# Patient Record
Sex: MALE | Race: WHITE | NOT HISPANIC OR LATINO | Employment: FULL TIME | URBAN - METROPOLITAN AREA
[De-identification: names, ages, dates, MRNs, and addresses within clinical notes are randomized per-mention and may not be internally consistent; named-entity substitution may affect disease eponyms.]

---

## 2024-11-02 ENCOUNTER — HOSPITAL ENCOUNTER (EMERGENCY)
Facility: HOSPITAL | Age: 59
Discharge: PSYCHIATRIC OR SUBSTANCE USE FACILITY | End: 2024-11-03
Attending: EMERGENCY MEDICINE
Payer: COMMERCIAL

## 2024-11-02 ENCOUNTER — APPOINTMENT (OUTPATIENT)
Dept: CARDIOLOGY | Facility: HOSPITAL | Age: 59
End: 2024-11-02
Payer: COMMERCIAL

## 2024-11-02 DIAGNOSIS — R45.851 SUICIDAL IDEATION: Primary | ICD-10-CM

## 2024-11-02 LAB
ALBUMIN SERPL BCP-MCNC: 4.6 G/DL (ref 3.4–5)
ALP SERPL-CCNC: 31 U/L (ref 33–120)
ALT SERPL W P-5'-P-CCNC: 25 U/L (ref 10–52)
AMPHETAMINES UR QL SCN: NORMAL
ANION GAP SERPL CALC-SCNC: 17 MMOL/L (ref 10–20)
APAP SERPL-MCNC: <10 UG/ML
AST SERPL W P-5'-P-CCNC: 17 U/L (ref 9–39)
BARBITURATES UR QL SCN: NORMAL
BASOPHILS # BLD AUTO: 0.05 X10*3/UL (ref 0–0.1)
BASOPHILS NFR BLD AUTO: 1.1 %
BENZODIAZ UR QL SCN: NORMAL
BILIRUB SERPL-MCNC: 1.1 MG/DL (ref 0–1.2)
BUN SERPL-MCNC: 15 MG/DL (ref 6–23)
BZE UR QL SCN: NORMAL
CALCIUM SERPL-MCNC: 9.3 MG/DL (ref 8.6–10.3)
CANNABINOIDS UR QL SCN: NORMAL
CHLORIDE SERPL-SCNC: 106 MMOL/L (ref 98–107)
CO2 SERPL-SCNC: 18 MMOL/L (ref 21–32)
CREAT SERPL-MCNC: 0.99 MG/DL (ref 0.5–1.3)
EGFRCR SERPLBLD CKD-EPI 2021: 89 ML/MIN/1.73M*2
EOSINOPHIL # BLD AUTO: 0.04 X10*3/UL (ref 0–0.7)
EOSINOPHIL NFR BLD AUTO: 0.8 %
ERYTHROCYTE [DISTWIDTH] IN BLOOD BY AUTOMATED COUNT: 14.5 % (ref 11.5–14.5)
ETHANOL SERPL-MCNC: <10 MG/DL
FENTANYL+NORFENTANYL UR QL SCN: NORMAL
GLUCOSE SERPL-MCNC: 172 MG/DL (ref 74–99)
HCT VFR BLD AUTO: 50 % (ref 41–52)
HGB BLD-MCNC: 16.5 G/DL (ref 13.5–17.5)
IMM GRANULOCYTES # BLD AUTO: 0.01 X10*3/UL (ref 0–0.7)
IMM GRANULOCYTES NFR BLD AUTO: 0.2 % (ref 0–0.9)
LYMPHOCYTES # BLD AUTO: 1.08 X10*3/UL (ref 1.2–4.8)
LYMPHOCYTES NFR BLD AUTO: 22.8 %
MCH RBC QN AUTO: 27.4 PG (ref 26–34)
MCHC RBC AUTO-ENTMCNC: 33 G/DL (ref 32–36)
MCV RBC AUTO: 83 FL (ref 80–100)
METHADONE UR QL SCN: NORMAL
MONOCYTES # BLD AUTO: 0.46 X10*3/UL (ref 0.1–1)
MONOCYTES NFR BLD AUTO: 9.7 %
NEUTROPHILS # BLD AUTO: 3.1 X10*3/UL (ref 1.2–7.7)
NEUTROPHILS NFR BLD AUTO: 65.4 %
NRBC BLD-RTO: 0 /100 WBCS (ref 0–0)
OPIATES UR QL SCN: NORMAL
OXYCODONE+OXYMORPHONE UR QL SCN: NORMAL
PCP UR QL SCN: NORMAL
PLATELET # BLD AUTO: 183 X10*3/UL (ref 150–450)
POTASSIUM SERPL-SCNC: 4.2 MMOL/L (ref 3.5–5.3)
PROT SERPL-MCNC: 7.5 G/DL (ref 6.4–8.2)
RBC # BLD AUTO: 6.02 X10*6/UL (ref 4.5–5.9)
SALICYLATES SERPL-MCNC: <3 MG/DL
SARS-COV-2 RNA RESP QL NAA+PROBE: NOT DETECTED
SODIUM SERPL-SCNC: 137 MMOL/L (ref 136–145)
WBC # BLD AUTO: 4.7 X10*3/UL (ref 4.4–11.3)

## 2024-11-02 PROCEDURE — 83036 HEMOGLOBIN GLYCOSYLATED A1C: CPT | Performed by: PSYCHIATRY & NEUROLOGY

## 2024-11-02 PROCEDURE — 80307 DRUG TEST PRSMV CHEM ANLYZR: CPT | Performed by: EMERGENCY MEDICINE

## 2024-11-02 PROCEDURE — 93005 ELECTROCARDIOGRAM TRACING: CPT

## 2024-11-02 PROCEDURE — 2500000001 HC RX 250 WO HCPCS SELF ADMINISTERED DRUGS (ALT 637 FOR MEDICARE OP): Performed by: EMERGENCY MEDICINE

## 2024-11-02 PROCEDURE — 80053 COMPREHEN METABOLIC PANEL: CPT | Performed by: EMERGENCY MEDICINE

## 2024-11-02 PROCEDURE — 2500000002 HC RX 250 W HCPCS SELF ADMINISTERED DRUGS (ALT 637 FOR MEDICARE OP, ALT 636 FOR OP/ED): Performed by: EMERGENCY MEDICINE

## 2024-11-02 PROCEDURE — 80143 DRUG ASSAY ACETAMINOPHEN: CPT | Performed by: EMERGENCY MEDICINE

## 2024-11-02 PROCEDURE — 99285 EMERGENCY DEPT VISIT HI MDM: CPT

## 2024-11-02 PROCEDURE — 85025 COMPLETE CBC W/AUTO DIFF WBC: CPT | Performed by: EMERGENCY MEDICINE

## 2024-11-02 PROCEDURE — 87635 SARS-COV-2 COVID-19 AMP PRB: CPT | Performed by: EMERGENCY MEDICINE

## 2024-11-02 PROCEDURE — 2500000002 HC RX 250 W HCPCS SELF ADMINISTERED DRUGS (ALT 637 FOR MEDICARE OP, ALT 636 FOR OP/ED): Performed by: STUDENT IN AN ORGANIZED HEALTH CARE EDUCATION/TRAINING PROGRAM

## 2024-11-02 PROCEDURE — 80061 LIPID PANEL: CPT | Performed by: PSYCHIATRY & NEUROLOGY

## 2024-11-02 PROCEDURE — 36415 COLL VENOUS BLD VENIPUNCTURE: CPT | Performed by: EMERGENCY MEDICINE

## 2024-11-02 RX ORDER — HYDROXYZINE HYDROCHLORIDE 25 MG/1
25 TABLET, FILM COATED ORAL ONCE
Status: COMPLETED | OUTPATIENT
Start: 2024-11-02 | End: 2024-11-02

## 2024-11-02 RX ORDER — PRAVASTATIN SODIUM 10 MG/1
10 TABLET ORAL DAILY
Status: DISCONTINUED | OUTPATIENT
Start: 2024-11-02 | End: 2024-11-03 | Stop reason: HOSPADM

## 2024-11-02 RX ORDER — PRAVASTATIN SODIUM 10 MG/1
10 TABLET ORAL NIGHTLY
Status: DISCONTINUED | OUTPATIENT
Start: 2024-11-02 | End: 2024-11-02

## 2024-11-02 RX ORDER — PRAVASTATIN SODIUM 10 MG/1
5 TABLET ORAL DAILY
COMMUNITY

## 2024-11-02 RX ORDER — PAROXETINE HYDROCHLORIDE 20 MG/1
10 TABLET, FILM COATED ORAL DAILY
Status: DISCONTINUED | OUTPATIENT
Start: 2024-11-02 | End: 2024-11-03 | Stop reason: HOSPADM

## 2024-11-02 RX ORDER — LISINOPRIL 10 MG/1
10 TABLET ORAL DAILY
Status: DISCONTINUED | OUTPATIENT
Start: 2024-11-02 | End: 2024-11-02

## 2024-11-02 RX ORDER — METFORMIN HYDROCHLORIDE 500 MG/1
1000 TABLET ORAL
Status: DISCONTINUED | OUTPATIENT
Start: 2024-11-02 | End: 2024-11-03 | Stop reason: HOSPADM

## 2024-11-02 RX ORDER — LISINOPRIL 10 MG/1
10 TABLET ORAL NIGHTLY
Status: DISCONTINUED | OUTPATIENT
Start: 2024-11-02 | End: 2024-11-03 | Stop reason: HOSPADM

## 2024-11-02 RX ORDER — HYDROXYZINE HYDROCHLORIDE 25 MG/1
25 TABLET, FILM COATED ORAL EVERY 4 HOURS PRN
Status: DISCONTINUED | OUTPATIENT
Start: 2024-11-02 | End: 2024-11-03 | Stop reason: HOSPADM

## 2024-11-02 RX ORDER — DAPAGLIFLOZIN 10 MG/1
10 TABLET, FILM COATED ORAL
COMMUNITY

## 2024-11-02 RX ORDER — DAPAGLIFLOZIN 5 MG/1
10 TABLET, FILM COATED ORAL DAILY
Status: DISCONTINUED | OUTPATIENT
Start: 2024-11-02 | End: 2024-11-03 | Stop reason: HOSPADM

## 2024-11-02 RX ORDER — LISINOPRIL 10 MG/1
10 TABLET ORAL 2 TIMES DAILY
Status: DISCONTINUED | OUTPATIENT
Start: 2024-11-02 | End: 2024-11-02

## 2024-11-02 RX ORDER — PAROXETINE 10 MG/1
10 TABLET, FILM COATED ORAL EVERY MORNING
Status: ON HOLD | COMMUNITY
End: 2024-11-04

## 2024-11-02 RX ORDER — LISINOPRIL 10 MG/1
10 TABLET ORAL DAILY
COMMUNITY

## 2024-11-02 RX ORDER — METFORMIN HYDROCHLORIDE 1000 MG/1
1000 TABLET ORAL
COMMUNITY

## 2024-11-02 RX ORDER — METFORMIN HYDROCHLORIDE 500 MG/1
1000 TABLET ORAL
Status: DISCONTINUED | OUTPATIENT
Start: 2024-11-02 | End: 2024-11-02 | Stop reason: SDUPTHER

## 2024-11-02 SDOH — HEALTH STABILITY: MENTAL HEALTH

## 2024-11-02 SDOH — HEALTH STABILITY: MENTAL HEALTH: BEHAVIORS/MOOD: CALM;COOPERATIVE

## 2024-11-02 SDOH — HEALTH STABILITY: MENTAL HEALTH: NEEDS EXPRESSED: EMOTIONAL

## 2024-11-02 SDOH — HEALTH STABILITY: MENTAL HEALTH: RISK OF SUICIDE: HIGH RISK

## 2024-11-02 SDOH — HEALTH STABILITY: MENTAL HEALTH: HAVE YOU HAD THESE THOUGHTS AND HAD SOME INTENTION OF ACTING ON THEM?: YES

## 2024-11-02 SDOH — SOCIAL STABILITY: SOCIAL NETWORK: VISITOR BEHAVIORS: APPROPRIATE FOR SITUATION

## 2024-11-02 SDOH — HEALTH STABILITY: MENTAL HEALTH: BEHAVIORAL HEALTH(WDL): EXCEPTIONS TO WDL

## 2024-11-02 SDOH — HEALTH STABILITY: MENTAL HEALTH: WAS THIS WITHIN THE PAST THREE MONTHS?: YES

## 2024-11-02 SDOH — HEALTH STABILITY: MENTAL HEALTH: ACTIVE SUICIDAL IDEATION WITH SPECIFIC PLAN AND INTENT (PAST 1 MONTH): NO

## 2024-11-02 SDOH — ECONOMIC STABILITY: HOUSING INSECURITY: FEELS SAFE LIVING IN HOME: NO

## 2024-11-02 SDOH — HEALTH STABILITY: MENTAL HEALTH: FOR HIGH RISK PATIENTS: ALL INTERVENTIONS ABOVE, PLUS:;1:1 PATIENT OBSERVER AT ALL TIMES

## 2024-11-02 SDOH — HEALTH STABILITY: MENTAL HEALTH: ACTIVE SUICIDAL IDEATION WITH SOME INTENT TO ACT, WITHOUT SPECIFIC PLAN (PAST 1 MONTH): NO

## 2024-11-02 SDOH — SOCIAL STABILITY: SOCIAL NETWORK: PARENT/GUARDIAN/SIGNIFICANT OTHER INVOLVEMENT: ATTENTIVE TO PATIENT NEEDS

## 2024-11-02 SDOH — HEALTH STABILITY: MENTAL HEALTH: BEHAVIORS/MOOD: SLEEPING

## 2024-11-02 SDOH — HEALTH STABILITY: MENTAL HEALTH: CONTENT: UNREMARKABLE

## 2024-11-02 SDOH — HEALTH STABILITY: MENTAL HEALTH: HAVE YOU ACTUALLY HAD ANY THOUGHTS OF KILLING YOURSELF?: YES

## 2024-11-02 SDOH — HEALTH STABILITY: MENTAL HEALTH: BEHAVIORS/MOOD: ANXIOUS

## 2024-11-02 SDOH — HEALTH STABILITY: MENTAL HEALTH: BEHAVIORS/MOOD: CALM

## 2024-11-02 SDOH — HEALTH STABILITY: MENTAL HEALTH: NON-SPECIFIC ACTIVE SUICIDAL THOUGHTS (PAST 1 MONTH): NO

## 2024-11-02 SDOH — SOCIAL STABILITY: SOCIAL NETWORK: EMOTIONAL SUPPORT GIVEN: REASSURE

## 2024-11-02 SDOH — HEALTH STABILITY: MENTAL HEALTH: HAVE YOU BEEN THINKING ABOUT HOW YOU MIGHT DO THIS?: YES

## 2024-11-02 SDOH — HEALTH STABILITY: MENTAL HEALTH: SUICIDAL BEHAVIOR (LIFETIME): NO

## 2024-11-02 SDOH — HEALTH STABILITY: MENTAL HEALTH
HAVE YOU STARTED TO WORK OUT OR WORKED OUT THE DETAILS OF HOW TO KILL YOURSELF? DO YOU INTENT TO CARRY OUT THIS PLAN?: YES

## 2024-11-02 SDOH — HEALTH STABILITY: MENTAL HEALTH: SLEEP PATTERN: DIFFICULTY FALLING ASLEEP

## 2024-11-02 SDOH — HEALTH STABILITY: MENTAL HEALTH
DEPRESSION SYMPTOMS: APPETITE CHANGE;CHANGE IN ENERGY LEVEL;FEELINGS OF HELPLESSNESS;FEELINGS OF HOPELESSESS;IMPAIRED CONCENTRATION;SLEEP DISTURBANCE

## 2024-11-02 SDOH — HEALTH STABILITY: MENTAL HEALTH: NEEDS EXPRESSED: DENIES

## 2024-11-02 SDOH — HEALTH STABILITY: MENTAL HEALTH: IN THE PAST FEW WEEKS, HAVE YOU WISHED YOU WERE DEAD?: NO

## 2024-11-02 SDOH — HEALTH STABILITY: MENTAL HEALTH: HAVE YOU WISHED YOU WERE DEAD OR WISHED YOU COULD GO TO SLEEP AND NOT WAKE UP?: YES

## 2024-11-02 SDOH — HEALTH STABILITY: MENTAL HEALTH: HAVE YOU EVER DONE ANYTHING, STARTED TO DO ANYTHING, OR PREPARED TO DO ANYTHING TO END YOUR LIFE?: NO

## 2024-11-02 SDOH — HEALTH STABILITY: MENTAL HEALTH: SUICIDE ASSESSMENT: ADULT (C-SSRS)

## 2024-11-02 SDOH — HEALTH STABILITY: MENTAL HEALTH: WISH TO BE DEAD (PAST 1 MONTH): YES

## 2024-11-02 SDOH — HEALTH STABILITY: MENTAL HEALTH: DESCRIBE YOUR THOUGHTS OF KILLING YOURSELF RIGHT NOW:: NO

## 2024-11-02 SDOH — HEALTH STABILITY: MENTAL HEALTH: ARE YOU HAVING THOUGHTS OF KILLING YOURSELF RIGHT NOW?: YES

## 2024-11-02 SDOH — SOCIAL STABILITY: SOCIAL INSECURITY: FAMILY BEHAVIORS: APPROPRIATE FOR SITUATION

## 2024-11-02 SDOH — HEALTH STABILITY: MENTAL HEALTH: ANXIETY SYMPTOMS: GENERALIZED;PANIC ATTACK;FEELINGS OF DOOM

## 2024-11-02 SDOH — SOCIAL STABILITY: SOCIAL INSECURITY: FAMILY BEHAVIORS: APPROPRIATE FOR SITUATION;CALM;COOPERATIVE

## 2024-11-02 SDOH — HEALTH STABILITY: MENTAL HEALTH: IN THE PAST WEEK, HAVE YOU BEEN HAVING THOUGHTS ABOUT KILLING YOURSELF?: YES

## 2024-11-02 SDOH — SOCIAL STABILITY: SOCIAL INSECURITY: FAMILY BEHAVIORS: APPROPRIATE FOR SITUATION;CALM;COOPERATIVE;SUPPORTIVE

## 2024-11-02 SDOH — HEALTH STABILITY: MENTAL HEALTH: IN THE PAST FEW WEEKS, HAVE YOU FELT THAT YOU OR YOUR FAMILY WOULD BE BETTER OFF IF YOU WERE DEAD?: YES

## 2024-11-02 SDOH — SOCIAL STABILITY: SOCIAL NETWORK: VISITOR BEHAVIORS: APPROPRIATE FOR SITUATION;CALM;COOPERATIVE

## 2024-11-02 SDOH — HEALTH STABILITY: MENTAL HEALTH: HAVE YOU EVER DONE ANYTHING, STARTED TO DO ANYTHING, OR PREPARED TO DO ANYTHING TO END YOUR LIFE?: YES

## 2024-11-02 SDOH — HEALTH STABILITY: MENTAL HEALTH: HAVE YOU EVER TRIED TO KILL YOURSELF?: NO

## 2024-11-02 SDOH — HEALTH STABILITY: MENTAL HEALTH
OTHER SUICIDE PRECAUTIONS INCLUDE: PATIENT PLACED IN AN EASILY OBSERVABLE ROOM WITH DOOR/CURTAIN REMAINING OPEN;PATIENT PLACED IN GOWN (SNAPS OR PAPER GOWNS PREFERRED) AND WANDED;REMAINING RISKS IDENTIFIED AND MITIGATED;PROVIDER NOTIFIED;PATIENT PLACED IN PSYCH SAFE ROOM (IF AVAILABLE);EL

## 2024-11-02 SDOH — HEALTH STABILITY: MENTAL HEALTH: BEHAVIORS/MOOD: APPROPRIATE FOR SITUATION;CALM;COOPERATIVE;SAD;TEARFUL

## 2024-11-02 SDOH — HEALTH STABILITY: MENTAL HEALTH: SLEEP PATTERN: INSOMNIA;DIFFICULTY FALLING ASLEEP

## 2024-11-02 SDOH — ECONOMIC STABILITY: GENERAL

## 2024-11-02 ASSESSMENT — LIFESTYLE VARIABLES
EVER FELT BAD OR GUILTY ABOUT YOUR DRINKING: NO
EVER HAD A DRINK FIRST THING IN THE MORNING TO STEADY YOUR NERVES TO GET RID OF A HANGOVER: NO
PRESCIPTION_ABUSE_PAST_12_MONTHS: NO
SUBSTANCE_ABUSE_PAST_12_MONTHS: NO
HAVE YOU EVER FELT YOU SHOULD CUT DOWN ON YOUR DRINKING: NO
HAVE PEOPLE ANNOYED YOU BY CRITICIZING YOUR DRINKING: NO
TOTAL SCORE: 0

## 2024-11-02 ASSESSMENT — COLUMBIA-SUICIDE SEVERITY RATING SCALE - C-SSRS
6. HAVE YOU EVER DONE ANYTHING, STARTED TO DO ANYTHING, OR PREPARED TO DO ANYTHING TO END YOUR LIFE?: NO
1. IN THE PAST MONTH, HAVE YOU WISHED YOU WERE DEAD OR WISHED YOU COULD GO TO SLEEP AND NOT WAKE UP?: YES
5. HAVE YOU STARTED TO WORK OUT OR WORKED OUT THE DETAILS OF HOW TO KILL YOURSELF? DO YOU INTEND TO CARRY OUT THIS PLAN?: YES
2. HAVE YOU ACTUALLY HAD ANY THOUGHTS OF KILLING YOURSELF?: YES
4. HAVE YOU HAD THESE THOUGHTS AND HAD SOME INTENTION OF ACTING ON THEM?: YES

## 2024-11-02 ASSESSMENT — PAIN SCALES - GENERAL: PAINLEVEL_OUTOF10: 0 - NO PAIN

## 2024-11-02 ASSESSMENT — PAIN - FUNCTIONAL ASSESSMENT: PAIN_FUNCTIONAL_ASSESSMENT: 0-10

## 2024-11-02 NOTE — ED PROVIDER NOTES
HPI   Chief Complaint   Patient presents with    Suicidal       Patient with a past medical history of depression, anxiety, hypertension presents emergency department for suicidal ideation.  Patient started a new job recently.  He is been depressed that he about self-harm for the last week.  States the thoughts got worse tonight and this morning where he was thinking about shooting himself.  Patient told his wife about his thoughts and he called 911 was brought here for further evaluation.  Patient does have a history of anxiety depression, no prior psychiatric hospitalizations.  He has had suicidal ideation in the past.  Patient's not currently seeing any therapists.              Patient History   Past Medical History:   Diagnosis Date    Anxiety     Diabetes mellitus (Multi)      History reviewed. No pertinent surgical history.  No family history on file.  Social History     Tobacco Use    Smoking status: Never    Smokeless tobacco: Not on file   Vaping Use    Vaping status: Never Used   Substance Use Topics    Alcohol use: Never    Drug use: Never       Physical Exam   ED Triage Vitals [11/02/24 0957]   Temperature Heart Rate Respirations BP   36.6 °C (97.8 °F) 98 18 (!) 156/100      Pulse Ox Temp Source Heart Rate Source Patient Position   97 % Temporal -- --      BP Location FiO2 (%)     -- --       Physical Exam  Vitals and nursing note reviewed.   Constitutional:       General: He is not in acute distress.     Appearance: He is well-developed.   HENT:      Head: Normocephalic and atraumatic.      Right Ear: External ear normal.      Left Ear: External ear normal.      Mouth/Throat:      Mouth: Mucous membranes are moist.      Pharynx: Oropharynx is clear.   Eyes:      Extraocular Movements: Extraocular movements intact.      Conjunctiva/sclera: Conjunctivae normal.   Neck:      Trachea: Trachea normal.   Cardiovascular:      Rate and Rhythm: Normal rate.   Pulmonary:      Effort: Pulmonary effort is normal.  No respiratory distress.      Breath sounds: Normal breath sounds.   Abdominal:      General: Bowel sounds are normal.      Palpations: Abdomen is soft.      Tenderness: There is no abdominal tenderness.   Musculoskeletal:         General: No swelling or tenderness.      Cervical back: No tenderness.   Skin:     General: Skin is warm and dry.      Capillary Refill: Capillary refill takes less than 2 seconds.   Neurological:      General: No focal deficit present.      Mental Status: He is alert and oriented to person, place, and time.   Psychiatric:         Mood and Affect: Mood is depressed.         Thought Content: Thought content includes suicidal ideation. Thought content does not include homicidal ideation.           ED Course & [facility]   ED Course as of 12/03/24 1038   Sat Nov 02, 2024   1015 Patient presents with suicidal ideation. Available chart reviewed. On initial assessment the patient was found non-toxic, no acute distress, vitals hemodynamically stable and afebrile.  Will start medical clearance. []   1221 Labs are unremarkable other than hyperglycemia of 172.  Urine drug screen is negative.  Acute tox panel is negative.  Patient is medically cleared for psychiatric evaluation and treatment. []   1634 Patient care signed out to oncoming physician pending EPAT evaluation.  Hydroxyzine was ordered as needed []      ED Course User Index  [JH] Ronald Rob MD         Diagnoses as of 12/03/24 1038   Suicidal ideation                 No data recorded     Roe Coma Scale Score: 15 (11/02/24 1007 : Brianda Chamorro RN)                           Medical Decision Making      Procedure  Procedures     Ronald Rob MD  11/02/24 1634       Ronald Rob MD  12/03/24 1038

## 2024-11-02 NOTE — ED NOTES
"Pt brought to ED for SI. Pt says he has no suicidal history and that this is new to him, he does have hx of anxiety and takes paxil. Pt says he has been having thoughts of harming  himself for the past few months, exacerbated by a new job that he started one month ago. Pt says he took a new job, \"can't handle it\", and can't find a new job. Pt says his thoughts got worse yesterday and started to scare him. Pt says he had thoughts of wanting to crash his car or shoot himself. Pt says his wife removed firearms from the house. Pt is calm and cooperative. Pt is tearful when talking to RN and is seeking help     Brianda Chamorro RN  11/02/24 1011    "

## 2024-11-03 ENCOUNTER — HOSPITAL ENCOUNTER (INPATIENT)
Facility: HOSPITAL | Age: 59
LOS: 5 days | Discharge: HOME | End: 2024-11-08
Attending: PSYCHIATRY & NEUROLOGY | Admitting: PSYCHIATRY & NEUROLOGY
Payer: COMMERCIAL

## 2024-11-03 VITALS
HEIGHT: 71 IN | DIASTOLIC BLOOD PRESSURE: 99 MMHG | SYSTOLIC BLOOD PRESSURE: 133 MMHG | RESPIRATION RATE: 16 BRPM | OXYGEN SATURATION: 97 % | WEIGHT: 185 LBS | BODY MASS INDEX: 25.9 KG/M2 | TEMPERATURE: 97.3 F | HEART RATE: 100 BPM

## 2024-11-03 DIAGNOSIS — F33.2 SEVERE RECURRENT MAJOR DEPRESSION WITHOUT PSYCHOTIC FEATURES (MULTI): ICD-10-CM

## 2024-11-03 DIAGNOSIS — E11.9 TYPE 2 DIABETES MELLITUS WITHOUT COMPLICATION, WITHOUT LONG-TERM CURRENT USE OF INSULIN (MULTI): ICD-10-CM

## 2024-11-03 LAB
CHOLEST SERPL-MCNC: 217 MG/DL (ref 0–199)
CHOLESTEROL/HDL RATIO: 4.4
HDLC SERPL-MCNC: 49.6 MG/DL
LDLC SERPL CALC-MCNC: 128 MG/DL
NON HDL CHOLESTEROL: 167 MG/DL (ref 0–149)
TRIGL SERPL-MCNC: 197 MG/DL (ref 0–149)
VLDL: 39 MG/DL (ref 0–40)

## 2024-11-03 PROCEDURE — 2500000001 HC RX 250 WO HCPCS SELF ADMINISTERED DRUGS (ALT 637 FOR MEDICARE OP): Performed by: EMERGENCY MEDICINE

## 2024-11-03 PROCEDURE — 2500000002 HC RX 250 W HCPCS SELF ADMINISTERED DRUGS (ALT 637 FOR MEDICARE OP, ALT 636 FOR OP/ED): Performed by: STUDENT IN AN ORGANIZED HEALTH CARE EDUCATION/TRAINING PROGRAM

## 2024-11-03 PROCEDURE — 1140000001 HC PRIVATE PSYCH ROOM DAILY

## 2024-11-03 PROCEDURE — 2500000002 HC RX 250 W HCPCS SELF ADMINISTERED DRUGS (ALT 637 FOR MEDICARE OP, ALT 636 FOR OP/ED): Performed by: EMERGENCY MEDICINE

## 2024-11-03 RX ORDER — PRAVASTATIN SODIUM 10 MG/1
5 TABLET ORAL DAILY
Status: DISCONTINUED | OUTPATIENT
Start: 2024-11-04 | End: 2024-11-08 | Stop reason: HOSPADM

## 2024-11-03 RX ORDER — METFORMIN HYDROCHLORIDE 1000 MG/1
1000 TABLET ORAL
Status: DISCONTINUED | OUTPATIENT
Start: 2024-11-04 | End: 2024-11-08 | Stop reason: HOSPADM

## 2024-11-03 RX ORDER — LISINOPRIL 10 MG/1
10 TABLET ORAL DAILY
Status: DISCONTINUED | OUTPATIENT
Start: 2024-11-03 | End: 2024-11-08 | Stop reason: HOSPADM

## 2024-11-03 RX ORDER — ADHESIVE BANDAGE
30 BANDAGE TOPICAL DAILY PRN
Status: DISCONTINUED | OUTPATIENT
Start: 2024-11-03 | End: 2024-11-08 | Stop reason: HOSPADM

## 2024-11-03 RX ORDER — ACETAMINOPHEN 325 MG/1
650 TABLET ORAL EVERY 4 HOURS PRN
Status: DISCONTINUED | OUTPATIENT
Start: 2024-11-03 | End: 2024-11-08 | Stop reason: HOSPADM

## 2024-11-03 RX ORDER — ALUMINUM HYDROXIDE, MAGNESIUM HYDROXIDE, AND SIMETHICONE 2400; 240; 2400 MG/30ML; MG/30ML; MG/30ML
30 SUSPENSION ORAL EVERY 6 HOURS PRN
Status: DISCONTINUED | OUTPATIENT
Start: 2024-11-03 | End: 2024-11-08 | Stop reason: HOSPADM

## 2024-11-03 RX ORDER — DAPAGLIFLOZIN 10 MG/1
10 TABLET, FILM COATED ORAL
Status: DISCONTINUED | OUTPATIENT
Start: 2024-11-04 | End: 2024-11-08 | Stop reason: HOSPADM

## 2024-11-03 RX ORDER — OLANZAPINE 10 MG/2ML
5 INJECTION, POWDER, FOR SOLUTION INTRAMUSCULAR EVERY 6 HOURS PRN
Status: DISCONTINUED | OUTPATIENT
Start: 2024-11-03 | End: 2024-11-08 | Stop reason: HOSPADM

## 2024-11-03 RX ORDER — OLANZAPINE 5 MG/1
5 TABLET ORAL EVERY 6 HOURS PRN
Status: DISCONTINUED | OUTPATIENT
Start: 2024-11-03 | End: 2024-11-08 | Stop reason: HOSPADM

## 2024-11-03 RX ORDER — TRAZODONE HYDROCHLORIDE 50 MG/1
25 TABLET ORAL NIGHTLY PRN
Status: DISCONTINUED | OUTPATIENT
Start: 2024-11-03 | End: 2024-11-08 | Stop reason: HOSPADM

## 2024-11-03 RX ORDER — HYDROXYZINE HYDROCHLORIDE 25 MG/1
25 TABLET, FILM COATED ORAL EVERY 6 HOURS PRN
Status: DISCONTINUED | OUTPATIENT
Start: 2024-11-03 | End: 2024-11-08 | Stop reason: HOSPADM

## 2024-11-03 SDOH — HEALTH STABILITY: MENTAL HEALTH: IN THE PAST FEW WEEKS, HAVE YOU WISHED YOU WERE DEAD?: YES

## 2024-11-03 SDOH — HEALTH STABILITY: PHYSICAL HEALTH
HOW OFTEN DO YOU NEED TO HAVE SOMEONE HELP YOU WHEN YOU READ INSTRUCTIONS, PAMPHLETS, OR OTHER WRITTEN MATERIAL FROM YOUR DOCTOR OR PHARMACY?: NEVER

## 2024-11-03 SDOH — SOCIAL STABILITY: SOCIAL INSECURITY: ARE YOU OR HAVE YOU BEEN THREATENED OR ABUSED PHYSICALLY, EMOTIONALLY, OR SEXUALLY BY ANYONE?: NO

## 2024-11-03 SDOH — ECONOMIC STABILITY: FOOD INSECURITY: WITHIN THE PAST 12 MONTHS, THE FOOD YOU BOUGHT JUST DIDN'T LAST AND YOU DIDN'T HAVE MONEY TO GET MORE.: NEVER TRUE

## 2024-11-03 SDOH — HEALTH STABILITY: PHYSICAL HEALTH: ON AVERAGE, HOW MANY MINUTES DO YOU ENGAGE IN EXERCISE AT THIS LEVEL?: 0 MIN

## 2024-11-03 SDOH — SOCIAL STABILITY: SOCIAL INSECURITY: FAMILY BEHAVIORS: APPROPRIATE FOR SITUATION;SUPPORTIVE;COOPERATIVE

## 2024-11-03 SDOH — SOCIAL STABILITY: SOCIAL INSECURITY: ARE YOU MARRIED, WIDOWED, DIVORCED, SEPARATED, NEVER MARRIED, OR LIVING WITH A PARTNER?: MARRIED

## 2024-11-03 SDOH — SOCIAL STABILITY: SOCIAL NETWORK: IN A TYPICAL WEEK, HOW MANY TIMES DO YOU TALK ON THE PHONE WITH FAMILY, FRIENDS, OR NEIGHBORS?: THREE TIMES A WEEK

## 2024-11-03 SDOH — SOCIAL STABILITY: SOCIAL NETWORK: HOW OFTEN DO YOU ATTEND CHURCH OR RELIGIOUS SERVICES?: NEVER

## 2024-11-03 SDOH — SOCIAL STABILITY: SOCIAL INSECURITY
WITHIN THE LAST YEAR, HAVE YOU BEEN KICKED, HIT, SLAPPED, OR OTHERWISE PHYSICALLY HURT BY YOUR PARTNER OR EX-PARTNER?: NO

## 2024-11-03 SDOH — SOCIAL STABILITY: SOCIAL INSECURITY: POSSIBLE ABUSE REPORTED TO:: OTHER (COMMENT)

## 2024-11-03 SDOH — SOCIAL STABILITY: SOCIAL INSECURITY: ARE THERE ANY APPARENT SIGNS OF INJURIES/BEHAVIORS THAT COULD BE RELATED TO ABUSE/NEGLECT?: NO

## 2024-11-03 SDOH — HEALTH STABILITY: MENTAL HEALTH: BEHAVIORS/MOOD: SAD;TEARFUL;CALM

## 2024-11-03 SDOH — SOCIAL STABILITY: SOCIAL INSECURITY: HAVE YOU HAD THOUGHTS OF HARMING ANYONE ELSE?: NO

## 2024-11-03 SDOH — HEALTH STABILITY: MENTAL HEALTH
DO YOU FEEL STRESS - TENSE, RESTLESS, NERVOUS, OR ANXIOUS, OR UNABLE TO SLEEP AT NIGHT BECAUSE YOUR MIND IS TROUBLED ALL THE TIME - THESE DAYS?: VERY MUCH

## 2024-11-03 SDOH — SOCIAL STABILITY: SOCIAL INSECURITY: FAMILY BEHAVIORS: CALM;SUPPORTIVE

## 2024-11-03 SDOH — HEALTH STABILITY: MENTAL HEALTH: BEHAVIORAL HEALTH(WDL): EXCEPTIONS TO WDL

## 2024-11-03 SDOH — SOCIAL STABILITY: SOCIAL INSECURITY: DO YOU FEEL ANYONE HAS EXPLOITED OR TAKEN ADVANTAGE OF YOU FINANCIALLY OR OF YOUR PERSONAL PROPERTY?: NO

## 2024-11-03 SDOH — ECONOMIC STABILITY: FOOD INSECURITY: HOW HARD IS IT FOR YOU TO PAY FOR THE VERY BASICS LIKE FOOD, HOUSING, MEDICAL CARE, AND HEATING?: NOT VERY HARD

## 2024-11-03 SDOH — HEALTH STABILITY: MENTAL HEALTH: IN THE PAST FEW WEEKS, HAVE YOU FELT THAT YOU OR YOUR FAMILY WOULD BE BETTER OFF IF YOU WERE DEAD?: YES

## 2024-11-03 SDOH — HEALTH STABILITY: MENTAL HEALTH: BEHAVIORS/MOOD: SLEEPING

## 2024-11-03 SDOH — HEALTH STABILITY: MENTAL HEALTH: BEHAVIORS/MOOD: CALM

## 2024-11-03 SDOH — ECONOMIC STABILITY: HOUSING INSECURITY: IN THE LAST 12 MONTHS, WAS THERE A TIME WHEN YOU WERE NOT ABLE TO PAY THE MORTGAGE OR RENT ON TIME?: NO

## 2024-11-03 SDOH — HEALTH STABILITY: MENTAL HEALTH: HAVE YOU EVER TRIED TO KILL YOURSELF?: NO

## 2024-11-03 SDOH — ECONOMIC STABILITY: HOUSING INSECURITY: IN THE PAST 12 MONTHS, HOW MANY TIMES HAVE YOU MOVED WHERE YOU WERE LIVING?: 1

## 2024-11-03 SDOH — SOCIAL STABILITY: SOCIAL INSECURITY: HAVE YOU HAD ANY THOUGHTS OF HARMING ANYONE ELSE?: NO

## 2024-11-03 SDOH — HEALTH STABILITY: MENTAL HEALTH: SLEEP PATTERN: EARLY AWAKENING

## 2024-11-03 SDOH — ECONOMIC STABILITY: INCOME INSECURITY: IN THE PAST 12 MONTHS HAS THE ELECTRIC, GAS, OIL, OR WATER COMPANY THREATENED TO SHUT OFF SERVICES IN YOUR HOME?: NO

## 2024-11-03 SDOH — SOCIAL STABILITY: SOCIAL INSECURITY: ABUSE: ADULT

## 2024-11-03 SDOH — SOCIAL STABILITY: SOCIAL INSECURITY: FAMILY BEHAVIORS: APPROPRIATE FOR SITUATION;CALM;SUPPORTIVE

## 2024-11-03 SDOH — SOCIAL STABILITY: SOCIAL INSECURITY: DOES ANYONE TRY TO KEEP YOU FROM HAVING/CONTACTING OTHER FRIENDS OR DOING THINGS OUTSIDE YOUR HOME?: NO

## 2024-11-03 SDOH — HEALTH STABILITY: MENTAL HEALTH: ARE YOU HAVING THOUGHTS OF KILLING YOURSELF RIGHT NOW?: NO

## 2024-11-03 SDOH — SOCIAL STABILITY: SOCIAL NETWORK: PARENT/GUARDIAN/SIGNIFICANT OTHER INVOLVEMENT: ATTENTIVE TO PATIENT NEEDS

## 2024-11-03 SDOH — ECONOMIC STABILITY: HOUSING INSECURITY: AT ANY TIME IN THE PAST 12 MONTHS, WERE YOU HOMELESS OR LIVING IN A SHELTER (INCLUDING NOW)?: NO

## 2024-11-03 SDOH — SOCIAL STABILITY: SOCIAL NETWORK
DO YOU BELONG TO ANY CLUBS OR ORGANIZATIONS SUCH AS CHURCH GROUPS, UNIONS, FRATERNAL OR ATHLETIC GROUPS, OR SCHOOL GROUPS?: NO

## 2024-11-03 SDOH — HEALTH STABILITY: MENTAL HEALTH: IN THE PAST WEEK, HAVE YOU BEEN HAVING THOUGHTS ABOUT KILLING YOURSELF?: YES

## 2024-11-03 SDOH — HEALTH STABILITY: MENTAL HEALTH: SUICIDE ASSESSMENT: ADULT (C-SSRS)

## 2024-11-03 SDOH — SOCIAL STABILITY: SOCIAL INSECURITY: WITHIN THE LAST YEAR, HAVE YOU BEEN AFRAID OF YOUR PARTNER OR EX-PARTNER?: NO

## 2024-11-03 SDOH — SOCIAL STABILITY: SOCIAL INSECURITY: FAMILY BEHAVIORS: CALM

## 2024-11-03 SDOH — SOCIAL STABILITY: SOCIAL INSECURITY: HAS ANYONE EVER THREATENED TO HURT YOUR FAMILY OR YOUR PETS?: NO

## 2024-11-03 SDOH — HEALTH STABILITY: MENTAL HEALTH: FOR HIGH RISK PATIENTS: 1:1 PATIENT OBSERVER AT ALL TIMES

## 2024-11-03 SDOH — HEALTH STABILITY: MENTAL HEALTH: BEHAVIORS/MOOD: SAD;TEARFUL

## 2024-11-03 SDOH — ECONOMIC STABILITY: FOOD INSECURITY: WITHIN THE PAST 12 MONTHS, YOU WORRIED THAT YOUR FOOD WOULD RUN OUT BEFORE YOU GOT THE MONEY TO BUY MORE.: NEVER TRUE

## 2024-11-03 SDOH — SOCIAL STABILITY: SOCIAL INSECURITY: WITHIN THE LAST YEAR, HAVE YOU BEEN HUMILIATED OR EMOTIONALLY ABUSED IN OTHER WAYS BY YOUR PARTNER OR EX-PARTNER?: NO

## 2024-11-03 SDOH — SOCIAL STABILITY: SOCIAL NETWORK: HOW OFTEN DO YOU GET TOGETHER WITH FRIENDS OR RELATIVES?: TWICE A WEEK

## 2024-11-03 SDOH — HEALTH STABILITY: PHYSICAL HEALTH: ON AVERAGE, HOW MANY DAYS PER WEEK DO YOU ENGAGE IN MODERATE TO STRENUOUS EXERCISE (LIKE A BRISK WALK)?: 0 DAYS

## 2024-11-03 SDOH — HEALTH STABILITY: MENTAL HEALTH

## 2024-11-03 SDOH — SOCIAL STABILITY: SOCIAL INSECURITY
WITHIN THE LAST YEAR, HAVE YOU BEEN RAPED OR FORCED TO HAVE ANY KIND OF SEXUAL ACTIVITY BY YOUR PARTNER OR EX-PARTNER?: NO

## 2024-11-03 SDOH — SOCIAL STABILITY: SOCIAL INSECURITY: WERE YOU ABLE TO COMPLETE ALL THE BEHAVIORAL HEALTH SCREENINGS?: YES

## 2024-11-03 SDOH — HEALTH STABILITY: MENTAL HEALTH: BEHAVIORS/MOOD: CALM;COOPERATIVE

## 2024-11-03 SDOH — ECONOMIC STABILITY: TRANSPORTATION INSECURITY: IN THE PAST 12 MONTHS, HAS LACK OF TRANSPORTATION KEPT YOU FROM MEDICAL APPOINTMENTS OR FROM GETTING MEDICATIONS?: NO

## 2024-11-03 SDOH — SOCIAL STABILITY: SOCIAL INSECURITY: DO YOU FEEL UNSAFE GOING BACK TO THE PLACE WHERE YOU ARE LIVING?: NO

## 2024-11-03 SDOH — SOCIAL STABILITY: SOCIAL NETWORK: HOW OFTEN DO YOU ATTEND MEETINGS OF THE CLUBS OR ORGANIZATIONS YOU BELONG TO?: NEVER

## 2024-11-03 ASSESSMENT — ACTIVITIES OF DAILY LIVING (ADL)
ADEQUATE_TO_COMPLETE_ADL: YES
LACK_OF_TRANSPORTATION: NO
TOILETING: INDEPENDENT
JUDGMENT_ADEQUATE_SAFELY_COMPLETE_DAILY_ACTIVITIES: YES
HEARING - LEFT EAR: FUNCTIONAL
EFFECT OF PAIN ON DAILY ACTIVITIES: MINIMAL
DRESSING YOURSELF: INDEPENDENT
FEEDING YOURSELF: INDEPENDENT
BATHING: INDEPENDENT
HEARING - RIGHT EAR: FUNCTIONAL
GROOMING: INDEPENDENT
PATIENT'S MEMORY ADEQUATE TO SAFELY COMPLETE DAILY ACTIVITIES?: YES
WALKS IN HOME: INDEPENDENT

## 2024-11-03 ASSESSMENT — LIFESTYLE VARIABLES
AUDIT-C TOTAL SCORE: 0
ORIENTATION AND CLOUDING OF SENSORIUM: ORIENTED AND CAN DO SERIAL ADDITIONS
CIWA TOTAL SCORE: 0
SUBSTANCE_ABUSE_PAST_12_MONTHS: NO
PRESCIPTION_ABUSE_PAST_12_MONTHS: NO
NAUSEA AND VOMITING: NO NAUSEA AND NO VOMITING
SKIP TO QUESTIONS 9-10: 1
PAROXYSMAL SWEATS: NO SWEAT VISIBLE
AGITATION: NORMAL ACTIVITY
HOW OFTEN DO YOU HAVE A DRINK CONTAINING ALCOHOL: NEVER
AUDIT-C TOTAL SCORE: 0
HOW OFTEN DO YOU HAVE 6 OR MORE DRINKS ON ONE OCCASION: NEVER
TREMOR: NO TREMOR
HOW MANY STANDARD DRINKS CONTAINING ALCOHOL DO YOU HAVE ON A TYPICAL DAY: PATIENT DOES NOT DRINK
ANXIETY: NO ANXIETY, AT EASE
HEADACHE, FULLNESS IN HEAD: NOT PRESENT
TOTAL_SCORE: 2
AUDITORY DISTURBANCES: NOT PRESENT
VISUAL DISTURBANCES: NOT PRESENT

## 2024-11-03 ASSESSMENT — PAIN - FUNCTIONAL ASSESSMENT: PAIN_FUNCTIONAL_ASSESSMENT: 0-10

## 2024-11-03 ASSESSMENT — PAIN SCALES - GENERAL
PAINLEVEL_OUTOF10: 0 - NO PAIN

## 2024-11-03 ASSESSMENT — COLUMBIA-SUICIDE SEVERITY RATING SCALE - C-SSRS
2. HAVE YOU ACTUALLY HAD ANY THOUGHTS OF KILLING YOURSELF?: NO
2. HAVE YOU ACTUALLY HAD ANY THOUGHTS OF KILLING YOURSELF?: NO
6. HAVE YOU EVER DONE ANYTHING, STARTED TO DO ANYTHING, OR PREPARED TO DO ANYTHING TO END YOUR LIFE?: NO
1. SINCE LAST CONTACT, HAVE YOU WISHED YOU WERE DEAD OR WISHED YOU COULD GO TO SLEEP AND NOT WAKE UP?: NO
6. HAVE YOU EVER DONE ANYTHING, STARTED TO DO ANYTHING, OR PREPARED TO DO ANYTHING TO END YOUR LIFE?: NO
1. SINCE LAST CONTACT, HAVE YOU WISHED YOU WERE DEAD OR WISHED YOU COULD GO TO SLEEP AND NOT WAKE UP?: NO

## 2024-11-03 ASSESSMENT — PATIENT HEALTH QUESTIONNAIRE - PHQ9
SUM OF ALL RESPONSES TO PHQ9 QUESTIONS 1 & 2: 5
2. FEELING DOWN, DEPRESSED OR HOPELESS: NEARLY EVERY DAY
1. LITTLE INTEREST OR PLEASURE IN DOING THINGS: MORE THAN HALF THE DAYS

## 2024-11-03 ASSESSMENT — PAIN DESCRIPTION - DESCRIPTORS: DESCRIPTORS: PATIENT UNABLE TO DESCRIBE

## 2024-11-03 NOTE — PROGRESS NOTES
Emergency Medicine Transition of Care Note.    I received Juwan Arana in signout from Dr. Babcock.  Please see the previous ED provider note for all HPI, PE and MDM up to the time of signout. This is in addition to the primary record.    In brief Juwan Arana is an 59 y.o. male presenting for   Chief Complaint   Patient presents with    Suicidal        ED Course as of 11/07/24 0134   Sat Nov 02, 2024   1015 Patient presents with suicidal ideation. Available chart reviewed. On initial assessment the patient was found non-toxic, no acute distress, vitals hemodynamically stable and afebrile.  Will start medical clearance. []   1221 Labs are unremarkable other than hyperglycemia of 172.  Urine drug screen is negative.  Acute tox panel is negative.  Patient is medically cleared for psychiatric evaluation and treatment. []   1634 Patient care signed out to oncoming physician pending EPAT evaluation.  Hydroxyzine was ordered as needed [JH]      ED Course User Index  [JH] Ronald Rob MD         Diagnoses as of 11/07/24 0134   Suicidal ideation       Medical Decision Making  59-year-old male with suicidal ideation, medically cleared, pending EPAT placement for inpatient psychiatric evaluation.  Patient with one-to-one sitter, resting comfortably.  Signed out to me pending EPAT placement.        Final diagnoses:   [R48.227] Suicidal ideation           Procedure  Procedures    Alexia Mendoza MD

## 2024-11-03 NOTE — PROGRESS NOTES
Emergency Medicine Transition of Care Note.    I received Juwan Arana in signout from Dr. Rob.  Please see the previous ED provider note for all HPI, PE and MDM up to the time of signout at 1600. This is in addition to the primary record.    In brief Juwan Arana is an 59 y.o. male presenting for   Chief Complaint   Patient presents with    Suicidal     At the time of signout we were awaiting: epat evaluation    ED Course as of 11/03/24 0052   Sat Nov 02, 2024   1015 Patient presents with suicidal ideation. Available chart reviewed. On initial assessment the patient was found non-toxic, no acute distress, vitals hemodynamically stable and afebrile.  Will start medical clearance. []   1221 Labs are unremarkable other than hyperglycemia of 172.  Urine drug screen is negative.  Acute tox panel is negative.  Patient is medically cleared for psychiatric evaluation and treatment. []   1634 Patient care signed out to oncoming physician pending EPAT evaluation.  Hydroxyzine was ordered as needed []      ED Course User Index  [] Ronald Rob MD       Medical Decision Making  Patient was signed out to me pending EPAT evaluation.  EPAT evaluated the patient and are recommending placement.  I did order the patient's evening dose of metformin and lisinopril.  The patient was signed out to the oncoming team pending placement.    Final diagnoses:   None           Procedure  Procedures    Aj Babcock MD

## 2024-11-03 NOTE — NURSING NOTE
"Nurse Admission Note    Reason for admission in patient's own words:  \"I having thoughts to end my life.\"     Patient living arrangements prior to admission:  home    Legal status:  voluntarily    Medical consult notification to:  Subha    Admission folder given to:   Pt.    Problems/treatment plans:  Depression/SI    Patient requests family to be notified of admission?    yes  Person notified:  wife    Strengths:  motivated and ready for change    Liabilities:  low self esteem, self-care deficit    Previous mental health treatment:  Past psychiatric history yes    Preliminary discharge planning needs:  Community resources   "

## 2024-11-03 NOTE — PROGRESS NOTES
Patient care signed out to me by Dr. Mendoza.  Patient awaiting placement.  Patient's able to be placed voluntarily.  EMTALA completed and patient will be transferred in stable condition.    MDM/ED Course  ED Course as of 11/03/24 1531   Sat Nov 02, 2024   1015 Patient presents with suicidal ideation. Available chart reviewed. On initial assessment the patient was found non-toxic, no acute distress, vitals hemodynamically stable and afebrile.  Will start medical clearance. []   1221 Labs are unremarkable other than hyperglycemia of 172.  Urine drug screen is negative.  Acute tox panel is negative.  Patient is medically cleared for psychiatric evaluation and treatment. [JH]   1634 Patient care signed out to oncoming physician pending EPAT evaluation.  Hydroxyzine was ordered as needed []      ED Course User Index  [] Ronald Rob MD         Diagnoses as of 11/03/24 1531   Suicidal ideation

## 2024-11-03 NOTE — CARE PLAN
"The patient's goals for the shift include \"settle in\"    The clinical goals for the shift include orient to unit    Over the shift, the patient did make progress toward the following goals. Barriers to progression include being newly admitted. Recommendations to address these barriers include orientation to unit.      Problem: Diabetes  Goal: Increase stability of blood glucose readings by end of shift  Outcome: Progressing     "

## 2024-11-04 PROBLEM — I10 PRIMARY HYPERTENSION: Status: ACTIVE | Noted: 2024-11-04

## 2024-11-04 PROBLEM — E11.9 TYPE 2 DIABETES MELLITUS WITHOUT COMPLICATION, WITHOUT LONG-TERM CURRENT USE OF INSULIN (MULTI): Status: ACTIVE | Noted: 2024-11-04

## 2024-11-04 PROBLEM — E78.2 MIXED HYPERLIPIDEMIA: Status: ACTIVE | Noted: 2024-11-04

## 2024-11-04 LAB
ATRIAL RATE: 88 BPM
EST. AVERAGE GLUCOSE BLD GHB EST-MCNC: 237 MG/DL
GLUCOSE BLD MANUAL STRIP-MCNC: 157 MG/DL (ref 74–99)
GLUCOSE BLD MANUAL STRIP-MCNC: 188 MG/DL (ref 74–99)
HBA1C MFR BLD: 9.9 %
P AXIS: 2 DEGREES
PR INTERVAL: 170 MS
Q ONSET: 252 MS
QRS COUNT: 14 BEATS
QRS DURATION: 97 MS
QT INTERVAL: 361 MS
QTC CALCULATION(BAZETT): 437 MS
QTC FREDERICIA: 410 MS
R AXIS: -29 DEGREES
T AXIS: -4 DEGREES
T OFFSET: 433 MS
VENTRICULAR RATE: 88 BPM

## 2024-11-04 PROCEDURE — 2500000001 HC RX 250 WO HCPCS SELF ADMINISTERED DRUGS (ALT 637 FOR MEDICARE OP): Performed by: INTERNAL MEDICINE

## 2024-11-04 PROCEDURE — 2500000002 HC RX 250 W HCPCS SELF ADMINISTERED DRUGS (ALT 637 FOR MEDICARE OP, ALT 636 FOR OP/ED): Performed by: INTERNAL MEDICINE

## 2024-11-04 PROCEDURE — 99222 1ST HOSP IP/OBS MODERATE 55: CPT | Performed by: PSYCHIATRY & NEUROLOGY

## 2024-11-04 PROCEDURE — 99221 1ST HOSP IP/OBS SF/LOW 40: CPT | Performed by: INTERNAL MEDICINE

## 2024-11-04 PROCEDURE — 82947 ASSAY GLUCOSE BLOOD QUANT: CPT

## 2024-11-04 PROCEDURE — 2500000001 HC RX 250 WO HCPCS SELF ADMINISTERED DRUGS (ALT 637 FOR MEDICARE OP): Performed by: PSYCHIATRY & NEUROLOGY

## 2024-11-04 PROCEDURE — 1140000001 HC PRIVATE PSYCH ROOM DAILY

## 2024-11-04 RX ORDER — ESCITALOPRAM OXALATE 10 MG/1
10 TABLET ORAL DAILY
Status: DISCONTINUED | OUTPATIENT
Start: 2024-11-05 | End: 2024-11-08 | Stop reason: HOSPADM

## 2024-11-04 RX ORDER — ESCITALOPRAM OXALATE 5 MG/1
5 TABLET ORAL ONCE
Status: COMPLETED | OUTPATIENT
Start: 2024-11-04 | End: 2024-11-04

## 2024-11-04 SDOH — ECONOMIC STABILITY: GENERAL

## 2024-11-04 SDOH — HEALTH STABILITY: MENTAL HEALTH: DEPRESSION SYMPTOMS: CHANGE IN ENERGY LEVEL;SLEEP DISTURBANCE;LOSS OF INTEREST

## 2024-11-04 SDOH — HEALTH STABILITY: MENTAL HEALTH: BEHAVIOR: ORIENTED

## 2024-11-04 SDOH — SOCIAL STABILITY: SOCIAL INSECURITY: RELIGIOUS/CULTURAL FACTORS: NONE REPORTED

## 2024-11-04 SDOH — HEALTH STABILITY: MENTAL HEALTH: MENTAL HEALTH TREATMENT: MEDICATION

## 2024-11-04 SDOH — ECONOMIC STABILITY: GENERAL: INCOME/EXPENSE INFORMATION: INCOME MEETS EXPENSES

## 2024-11-04 SDOH — HEALTH STABILITY: MENTAL HEALTH: ANXIETY SYMPTOMS: GENERALIZED

## 2024-11-04 SDOH — SOCIAL STABILITY: SOCIAL INSECURITY: FEELS SAFE LIVING IN HOME: YES

## 2024-11-04 SDOH — ECONOMIC STABILITY: HOUSING INSECURITY: FEELS SAFE LIVING IN HOME: YES

## 2024-11-04 SDOH — HEALTH STABILITY: MENTAL HEALTH: PREVIOUS MENTAL HEALTH TREATMENT: OTHER (COMMENT)

## 2024-11-04 ASSESSMENT — ACTIVITIES OF DAILY LIVING (ADL): BATHING: NO ASSISTANCE

## 2024-11-04 ASSESSMENT — COLUMBIA-SUICIDE SEVERITY RATING SCALE - C-SSRS
6. HAVE YOU EVER DONE ANYTHING, STARTED TO DO ANYTHING, OR PREPARED TO DO ANYTHING TO END YOUR LIFE?: NO
1. SINCE LAST CONTACT, HAVE YOU WISHED YOU WERE DEAD OR WISHED YOU COULD GO TO SLEEP AND NOT WAKE UP?: NO
1. SINCE LAST CONTACT, HAVE YOU WISHED YOU WERE DEAD OR WISHED YOU COULD GO TO SLEEP AND NOT WAKE UP?: NO
6. HAVE YOU EVER DONE ANYTHING, STARTED TO DO ANYTHING, OR PREPARED TO DO ANYTHING TO END YOUR LIFE?: NO
2. HAVE YOU ACTUALLY HAD ANY THOUGHTS OF KILLING YOURSELF?: NO
2. HAVE YOU ACTUALLY HAD ANY THOUGHTS OF KILLING YOURSELF?: NO

## 2024-11-04 ASSESSMENT — PAIN - FUNCTIONAL ASSESSMENT: PAIN_FUNCTIONAL_ASSESSMENT: 0-10

## 2024-11-04 ASSESSMENT — LIFESTYLE VARIABLES
SUBSTANCE_ABUSE_PAST_12_MONTHS: NO
PRESCIPTION_ABUSE_PAST_12_MONTHS: NO

## 2024-11-04 ASSESSMENT — PAIN SCALES - GENERAL: PAINLEVEL_OUTOF10: 0 - NO PAIN

## 2024-11-04 NOTE — H&P
"      The reason for admission includes: depression worse, difficulty sleeping, feeling depressed, feeling suicidal, stressed at work, and \"I wanted to die\".  Onset of symptoms was gradual starting 1 month ago with gradually worsening course since that time. Psychosocial Stressors: occupational.        Chart reviewed including notes from Care Everywhere and documentation incorporated by reference and direct quotation.    History Of Present Illness  Juwan Arana is a 59 y.o. male.    ED notes:  Pt brought to ED for SI. Pt says he has no suicidal history and that this is new to him, he does have hx of anxiety and takes paxil. Pt says he has been having thoughts of harming himself for the past few months, exacerbated by a new job that he started one month ago. Pt says he took a new job, \"can't handle it\", and can't find a new job. Pt says his thoughts got worse yesterday and started to scare him. Pt says he had thoughts of wanting to crash his car or shoot himself. Pt says his wife removed firearms from the house. Pt is calm and cooperative. Pt is tearful when talking to RN and is seeking help   Patient with a past medical history of depression, anxiety, hypertension presents emergency department for suicidal ideation. Patient started a new job recently. He is been depressed that he about self-harm for the last week. States the thoughts got worse tonight and this morning where he was thinking about shooting himself. Patient told his wife about his thoughts and he called 911 was brought here for further evaluation. Patient does have a history of anxiety depression, no prior psychiatric hospitalizations. He has had suicidal ideation in the past. Patient's not currently seeing any therapists.     EPAT notes:  58 y/o  male seen via telehealth at [facility] for a psychiatric evaluation. He was brought in by EMS after a call was placed due to pt sharing he was struggling with suicidal ideation with a plan. " History of anxiety and depression, currently being monitored by PCP for med management. He denies any drug of alcohol usage, UDS confirmed his reports. Admits to suicidal ideation for the last month with is progressively getting worse over the last few days. Pt started a new job a month ago and it has caused an increase of anxiety and depression. Pt did have a firearm in the home, wife removed today. No history of psychiatric placement. Denies hallucinations, delusions or paranoia.    58 y/o male presents flat, engaged but somber. He shares that he does not feel safe with himself due to the increase of suicidal ideation over the last day. He reports at 3am in the morning he was having thoughts of getting his firearm and shooting himself. Pt explains that after starting his new job he feels overwhelmed. The new employer requires him to learn a few online systems to make orders and he is having a hard time catching on. At times he does not feel supported in learning everything but employer does appear to be patient with him. Pt feels he should know it by now and feels triggered before and after work. He is sleeping roughly 1-2 hours night and has loss 15lbs in the last 3 -4 weeks. “I can't eat or sleep .I don't feel safe, I don't want to hurt myself. I need help” He is chronically on edge and struggling with tremors and managing his racing thoughts. States he is compliant with medication but it does not help with reducing anxiety at this time.     Unit encounter:  Patient initially sitting in group with sitter at arms length.  Emerged willingly to meet with provider and  in the group room.  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  During this time, he reports additional stressors were placed on him at work and found this unmanageable.  He notes this led to  worsening depression including reduced appetite with significant weight loss, trouble with sleep, and resulting suicidality with thoughts of driving his car into a fixed object or using the firearm at home to shoot himself.  He describes that he reached out to his provider was encouraged to come to the hospital and did so.  He notes his goal is to feel better to be less depressed to be able to get back on track.  He reports since coming into the hospital he is already starting to feel better and more safe and is not thinking of harming himself at this time.  He is eager for improvement and willing to participate to this extent including adjustment of medications.  He denies thoughts of harming others and he does not present with features of psychosis and rebeca denying hallucinosis and delusions.  Patient reports he feels able to remain safe on the unit and able to communicate otherwise if he is no longer able to feel safe with understanding that the sitter would be discontinued as such.      Past Medical History  He has a past medical history of Anxiety and Diabetes mellitus (Multi).    Past Psychiatric History:   Denies previous linkage with psychiatry but notes that he saw a counselor in the past.  He reports he has tried 2 different psychiatric medications from primary care provider including Zoloft which he reports gave him hallucinations and Paxil which has been dosed 10 mg and he is finding it to be unhelpful.  He denies previous psychiatric admissions and he denies previous suicide attempts.    He endorses a family history of schizophrenia.  He denies family members having  by suicide.    Surgical History  He has no past surgical history on file.     Social History  He reports that he has never smoked. He does not have any smokeless tobacco history on file. He reports that he does not drink alcohol and does not use drugs.  Patient reports he graduated high school and has been working in the food service  "industry since age 16.  He currently lives at home with his wife and has 2 grown children in their 30s.  He reports there is a 22 caliber firearm in the home but he believes it has been removed by his wife.  He denies any  affiliation, he denies any legal troubles past or present.     Allergies  Patient has no known allergies.    Review of Systems    Psychiatric ROS - Adult  Anxiety: General Anxiety Disorder (ALBERTA)ALBERTA Behaviors: difficult to control worry, excessive anxiety/worry, difficulty concentrating, restlessness, and sleep disturbance  Depression: anhedonia, appetite decreased, concentration, energy, guilt, helpless, hopeless, persistent thoughts of death, sleep decreased , suicidal thoughts, and withdrawn  Delirium: negative  Psychosis: negative  Tamela: negative  Safety Issues: passive death wish, suicidal ideation, and weapon (gun) in home  Psychiatric ROS Comment: as noted    Physical Exam        Mental Status Examination  General Appearance: Age appearing male wearing hospital provided garments good eye contact  Gait/Station: Ambulating steadily without need for assistance, no abnormal movements observed including tremor or extrapyramidal features  Speech: Normal rate, volume, prosody  Mood: \" While I have been depressed\"  Affect: Constricted and Congruent with mood and topic of conversation  Thought Process: Linear, goal directed  Thought Associations: No loosening of associations  Thought Content: suicidal  Perception: No perceptual abnormalities noted  Level of Consciousness: Alert  Orientation: Alert and oriented to person, place, time and situation  Attention and Concentration: Intact  Recent Memory: Intact as evidenced by ability to recall details from the past 24 hours   Remote Memory: Intact as evidenced by ability to recall previous medical issues   Executive function: Intact  Language: Naming intact  Fund of knowledge: Good  Insight: Fair, as evidenced by recognition of mental health " "crisis and decompensation and benefit of treatment  Judgment: Fair, as evidenced by help-seeking behavior, ability to reason through medical decision making, compliance with treatment recommendations, and desire to cause harm to self      Psychiatric Risk Assessment  Violence Risk Assessment: major mental illness, male, and presence of firearms in home  Acute Risk of Harm to Others is Considered: low   Suicide Risk Assessment: access to weapons, , current psychiatric illness, feelings of hopelessness, male, presence of firearms in home, suicidal ideations, and suicidal plans  Protective Factors against Suicide: adherence to  treatment, employment, hopefulness/future orientation, marriage/partnership, moral objections to suicide, positive family relationships, and sense of responsibility toward family  Acute Risk of Harm to Self is Considered: moderate    Last Recorded Vitals  Blood pressure 114/78, pulse 85, temperature 36.9 °C (98.4 °F), temperature source Temporal, resp. rate 16, height 1.803 m (5' 11\"), weight 83.8 kg (184 lb 12.8 oz), SpO2 100%.    Relevant Results        Results for orders placed or performed during the hospital encounter of 11/03/24 (from the past 96 hours)   Lipid Panel   Result Value Ref Range    Cholesterol 217 (H) 0 - 199 mg/dL    HDL-Cholesterol 49.6 mg/dL    Cholesterol/HDL Ratio 4.4     LDL Calculated 128 (H) <=99 mg/dL    VLDL 39 0 - 40 mg/dL    Triglycerides 197 (H) 0 - 149 mg/dL    Non HDL Cholesterol 167 (H) 0 - 149 mg/dL   Hemoglobin A1C   Result Value Ref Range    Hemoglobin A1C 9.9 (H) See comment %    Estimated Average Glucose 237 Not Established mg/dL   POCT GLUCOSE   Result Value Ref Range    POCT Glucose 157 (H) 74 - 99 mg/dL          Assessment/Plan   Assessment & Plan  Severe recurrent major 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  Patient endorsing symptoms including trouble with sleep, reduced interest, increased guilt, low energy, decreased appetite with weight loss of 18 lbs, suicidal thoughts with plan with methods and access to these methods.  Patient reaching out for assistance and brought into the emergency department for referral and admission to psychiatric hospital.  Patient willing to have medications changed and adjusted well in the hospital and remain admitted voluntarily pending improvement.  Patient will benefit from the admission for safety stabilization and monitoring.    Biological -     Reviewed with patient previous meds including Zoloft which gave him hallucinations as a side effect per his report and we will not use this.    Reviewed with patient the use of Paxil which is currently likely subtherapeutic we dosed and in older adults is not necessarily indicated due to the side effect profile and so we will not use this.    Discussed with patient initiating Lexapro and titrating to a treatment dose while being monitored and patient consents to this.  As such we will initiate Lexapro 5 mg once today for depression and anxiety and then titrating to 10 mg daily starting tomorrow and thereafter for treatment of depression anxiety.    Patient also made aware that trazodone will be available as a as needed to help with sleep related to mood disorder.  Initial dose of 25 mg can be titrated.    Medical to please co-manage those pertinences     Discussion with patient regarding risk benefits and alternatives and side effects with opportunity to ask questions and questions answered.  Patient given consent to the plan as noted    2. Psychological -       Patient is encouraged to participate with the therapeutic milieu and program and group therapy      3. Sociological -      Patient encouraged to cooperate with social work staff on issues relevant to discharge planning         Goals for  discharge include:  Psychiatric condition: to lower acute risk, return to baseline level of functioning, work to identify positive coping skills to manage psychiatric symptoms, allow for reconciliation of medications  Physical condition: increase daily physical activity, demonstrate interest in completing daily activity, and complete Activities of Daily Living  Social function: identify protective factors and family supports, increase social interactions on the unit with peers and providing staff, and demonstrate appropriate problem solving skills for engaging after care on discharge    I personally spent 50 minutes today providing care for this patient, including preparation, face to face time, documentation and other services such as review of medical records, diagnostic result, patient education, counseling, coordination of care as specified in the encounter.    Parts of this chart have been completed using voice recognition software.  Please excuse any errors of transcription.  Despite the medical decision making time stamp, my medical decision making has taken place during the patient's entire visit.  Thought process and reason for plan has been formulated from the time that I saw the patient until the time of disposition and is not specific to one specific moment during their visit and furthermore the medical decision making encompasses the entire chart and not only that represented in this note.    Nutrition/Malnutrition:  The diagnosis of malnutrition has significant impact on risk adjustment, mortality and readmission rates, length of stay and hospital reimbursement.  The below is a representation of nutrition status if evaluated.  If blank, there is no associated malnutrition finding to incorporate per the dietician team:               Medication Consent  Medication Consent: risks, benefits, side effects reviewed for all ordered meds and patient expressed understanding and consent obtained    Rambo MOSCOSO  Lan, DO

## 2024-11-04 NOTE — GROUP NOTE
Group Topic: Leisure Skills   Group Date: 11/4/2024  Start Time: 1330  End Time: 1430  Facilitators: QUINTON Orona   Department: [facility] VIRTUAL    Number of Participants: 12   Group Focus: leisure skills, other following directions, self-esteem, and social skills  Treatment Modality: Leisure Development and Other: Recreation Therapy  Interventions utilized were leisure development and mental fitness  Purpose: In this recreational therapy session patients engaged in a group game of “[place] Mentality” developing social interaction, increased focus and following directions. This activity aims to provide a positive and enjoyable experience for participants.           Name: Juwan Arana YOB: 1965   MR: [id number]      Facilitator: Recreational Therapist  Level of Participation: active  Quality of Participation: appropriate/pleasant, attentive, cooperative, and engaged  Interactions with others: appropriate  Mood/Affect: appropriate and brightens with interaction  Cognition: coherent/clear  Progress: Moderate  Comments: pt problem is depressed mood. Pt engages easily and appropriately. Able to follow game directions. with staff and peers. Observed smiling at times. Displays good attention to tasks.    Plan: continue with services

## 2024-11-04 NOTE — NURSING NOTE
HPILIP NOTE     Problem:  SI     Behavior:  Patient was calm, quiet and cooperative. He spent time in the group room watching tv and interacting with peers. He denies SI/HI    Appetite/Meals: Snacks provided       Interventions:  Every 15 minute checks    Response:  Safety maintained     Plan:  Continue every 15 minute checks

## 2024-11-04 NOTE — CARE PLAN
Problem: Risk for Suicide  Goal: Accepts medications as prescribed/needed this shift  Outcome: Progressing     Problem: Risk for Suicide  Goal: No self harm this shift  Outcome: Progressing   The patient's goals for the shift include talk with a doctor    The clinical goals for the shift include encourage participation in milieu    Over the shift, the patient did make progress toward the following goals. Barriers to progression include Depression. Recommendations to address these barriers include encourage pt to take scheduled medications, discuss in team.

## 2024-11-04 NOTE — NURSING NOTE
PHILIP NOTE     Problem:  SI     Behavior:  Pt states his mood is good.  He denies suicidal ideation.  He is going to groups and taking scheduled medications.  Group Participation: yes  Appetite/Meals: good       Interventions:  1:1 interaction with encouragement, give scheduled medications, every 15 minute checks, encourage group participation    Response:  Pt denies suicidal ideation.  He is going to groups and taking scheduled medication.      Plan:  1:1 interaction with encouragement, give scheduled medications, every 15 minute checks, encourage group participation

## 2024-11-04 NOTE — GROUP NOTE
Group Topic: Personal Responsibility   Group Date: 11/4/2024  Start Time: 1100  End Time: 1200  Facilitators: QUINTON Orona   Department: [facility] REHABTH VIRTUAL    Number of Participants: 11   Group Focus: coping skills, personal responsibility, problem solving, and self-awareness  Treatment Modality: Psychoeducation and Other: Recreation Therapy   Interventions utilized were exploration, patient education, problem solving, and support  Purpose: Patients engaged in a group session aimed to increase knowledge focused on stressors/triggers, warning signs/symptoms and personal responsibilities. Pts also encouraged to create a coping skills toolbox for those times when in a distressed situation/s. This group also aims to help foster use of prosocial behaviors and become aware of and handle negative behaviors in a more effective manner.     Name: Juwan Arana YOB: 1965   MR: [id number]      Facilitator: Recreational Therapist  Level of Participation: moderate  Quality of Participation: appropriate/pleasant and cooperative  Interactions with others:  quiet and appropriate  Mood/Affect: flat  Cognition: coherent/clear  Progress: Moderate  Comments: pt problem is depressed mood. Pt attends group with sitter at side. Quiet with little engagement during discussion. Pt leaves to speak with other staff but returns. Observed completing copping skills list.   Plan: continue with services

## 2024-11-04 NOTE — CONSULTS
[facility]: [facility]  PROGRESS NOTE      Juwan Arana is a 59 y.o. male that is being seen  today for medical management at [facility]..  Subjective   Patient is a 59-year-old male with a history of diabetes, hypertension, depression and anxiety and hyperlipidemia who is being seen for medical management [facility].  Patient started a new job and has been having increased depression and anxiety.  Patient was evaluated in the ER and medically cleared for admission to [facility].  Patient was having some suicidal ideations.  Denies any suicidal ideations at present.  Patient's blood pressure has been fairly controlled.  Patient is on statins for hyperlipidemia.  Patient also is on Farxiga for diabetes.  Patient's recent hemoglobin A1c is 9.9.  Patient is also on metformin.  Most likely patient was not taking his medications.  Blood sugar on the unit is 159.      ROS  Negative for fever or chills  Negative for sore throat, ear pain, nasal discharge  Negative for cough, shortness of breath or wheezing  Negative for chest pain, palpitations, swelling of legs  Negative for abdominal pain, constipation, diarrhea, blood in the stools  Negative for urinary complaints  Negative for headache, dizziness, weakness or numbness  Negative for joint pain  Positive for depression and anxiety  All other systems reviewed and were negative   Vitals:    11/04/24 0520   BP: 114/78   Pulse: 85   Resp: 16   Temp: 36.9 °C (98.4 °F)   SpO2: 100%      Vitals:    11/03/24 1735   Weight: 83.8 kg (184 lb 12.8 oz)     Body mass index is 25.77 kg/m².  Physical Exam  Constitutional: Patient does not appear to be in any acute distress  Head and Face: NCAT  Eyes: Normal external exam, EOMI  ENT: Normal external inspection of ears and nose. Oropharynx normal.  Cardiovascular: RRR, S1/S2, no murmurs, rubs, or gallops, radial pulses +2, no edema of extremities  Pulmonary: CTAB, no respiratory distress.  Abdomen: +BS, soft,  non-tender, nondistended, no guarding or rebound, no masses noted  MSK: No joint swelling, normal movements of all extremities. Range of motion- normal.  Skin- No lesions, contusions, or erythema.  Peripheral puslses palpable bilaterally 2+  Neuro: AAO X3, Cranial nerves 2-12 grossly intact,DTR 2+ in all 4 limbs       LABS   [unfilled]  Lab Results   Component Value Date    GLUCOSE 172 (H) 11/02/2024    CALCIUM 9.3 11/02/2024     11/02/2024    K 4.2 11/02/2024    CO2 18 (L) 11/02/2024     11/02/2024    BUN 15 11/02/2024    CREATININE 0.99 11/02/2024     Lab Results   Component Value Date    ALT 25 11/02/2024    AST 17 11/02/2024    ALKPHOS 31 (L) 11/02/2024    BILITOT 1.1 11/02/2024     Lab Results   Component Value Date    WBC 4.7 11/02/2024    HGB 16.5 11/02/2024    HCT 50.0 11/02/2024    MCV 83 11/02/2024     11/02/2024     Lab Results   Component Value Date    CHOL 217 (H) 11/02/2024     Lab Results   Component Value Date    HDL 49.6 11/02/2024     Lab Results   Component Value Date    LDLCALC 128 (H) 11/02/2024     Lab Results   Component Value Date    TRIG 197 (H) 11/02/2024     Lab Results   Component Value Date    HGBA1C 9.9 (H) 11/02/2024     Other labs not included in the list above were reviewed either before or during this encounter.    History    Past Medical History:   Diagnosis Date    Anxiety     Diabetes mellitus (Multi)      No past surgical history on file.  No family history on file.  No Known Allergies  Current Facility-Administered Medications on File Prior to Encounter   Medication Dose Route Frequency Provider Last Rate Last Admin    [DISCONTINUED] dapagliflozin propanediol (Farxiga) tablet 10 mg  10 mg oral Daily Ronald Rob MD   10 mg at 11/03/24 0823    [DISCONTINUED] hydrOXYzine HCL (Atarax) tablet 25 mg  25 mg oral q4h PRN Ronald Rob MD   25 mg at 11/03/24 0823    [DISCONTINUED] lisinopril tablet 10 mg  10 mg oral Nightly Ronald Rob MD        [DISCONTINUED]  metFORMIN (Glucophage) tablet 1,000 mg  1,000 mg oral BID Aj Babcock MD   1,000 mg at 11/03/24 0824    [DISCONTINUED] PARoxetine (Paxil) tablet 10 mg  10 mg oral Daily Ronald Rob MD   10 mg at 11/03/24 0824    [DISCONTINUED] pravastatin (Pravachol) tablet 10 mg  10 mg oral Daily Ronald Rob MD   10 mg at 11/03/24 0824     Current Outpatient Medications on File Prior to Encounter   Medication Sig Dispense Refill    dapagliflozin-saxagliptin (Qtern) 5-5 mg tablet Take 1 tablet by mouth once daily.      Farxiga 10 mg Take 1 tablet (10 mg) by mouth once daily with breakfast.      lisinopril 10 mg tablet Take 1 tablet (10 mg) by mouth once daily.      metFORMIN (Glucophage) 1,000 mg tablet Take 1 tablet (1,000 mg) by mouth 2 times daily (morning and late afternoon).      pravastatin (Pravachol) 10 mg tablet Take 0.5 tablets (5 mg) by mouth once daily.      [DISCONTINUED] PARoxetine (Paxil) 10 mg tablet Take 1 tablet (10 mg) by mouth once daily in the morning.         There is no immunization history on file for this patient.  Patient's medical history was reviewed and updated either before or during this encounter.  ASSESSMENT / PLAN:  Active Hospital Problems    Type 2 diabetes mellitus without complication, without long-term current use of insulin (Multi)      Mixed hyperlipidemia      Primary hypertension      *Severe recurrent major depression without psychotic features (Multi)       Patient is being seen for medical management at [facility].  Patient denies any suicidal ideations at present.  Patient is being seen by [facility] team.  Patient's hemoglobin A1c is 9.9 most likely patient was noncompliant with medications at home.  Will be on metformin and Farxiga and will continue to monitor blood sugars.  Blood pressure is fairly controlled.      Mik Mascorro MD

## 2024-11-04 NOTE — GROUP NOTE
Group Topic: Music Therapy   Group Date: 11/4/2024  Start Time: 1000  End Time: 1100  Facilitators: Inge Cohn   [facility] EXPRESS[facility]ER [facility]IRT[facility]    Number of Participants: 12   Group Focus: education, expressive outlet, leisure skills, music therapy, opportunity for choice/control, and reality orientation  Treatment Modality: Leisure Development and Music Therapy  Interventions Utilized were: active music engagement, education/instruction, exploration, leisure development, passive music engagement, reality testing, and sharing/discussion      Name: Juwan Arana YOB: 1965   MR: [id number]      Level of Participation: moderate  Quality of Participation: appropriate/pleasant, cooperative, engaged, and initiates communication  Interactions with others: appropriate  Mood/Affect: anxious and appropriate  Cognition, Pre Treatment: attentive, coherent/clear, goal directed, and logical  Cognition, Post Treatment: attentive, coherent/clear, goal directed, and logical  Progress: Minimal  Plan: continue with services

## 2024-11-04 NOTE — PROGRESS NOTES
Psychosocial assessment completed with pt. Pt is a 59y  male admitted for derepression and SI. Pt has been having increasing depression over the last month with thoughts to crash his car or shoot himself. Pt reports that the gun at home has been removed. Pt had reached out to his doctor and EMS was called to his home. Pt reports that work stressors have triggered his depression and lead to suicidal thoughts. Pt recently started a new job and there are a lot of work pressures. Pt does not like his current employee and plans to not return. Pt plans to find alternative employment. Pt lives with his spouse. Pt has two grown children and grandchildren which he has reasons to live for. Pt currently denies any SI. Pt denies that he would have acted upon the thoughts but he had difficulty controlling the thoughts. Pt also reported decreased sleep and appetite. Pt denies AVH or HI. Pt does not use tobacco, alcohol or drugs. Pt reports that the depression started about 6 yrs ago. Pt has been prescribed Paxil by his PCP but has not seen a psychiatrist. Pt has no past SA or inpatient hospitalizations. Pt has a sister with schizophrenia but no only family history of mental health. Pt does not have contact with this sister. Pt agreeable to treatment and coordination of outpatient services. Pt is pleasant and cooperative. Pt is A&Ox4. Voicemail left for pt's wife for collateral information. LSW to follow for structure, support, and discharge planning.     DHARA GreenW

## 2024-11-04 NOTE — PROGRESS NOTES
LSW met with pts wife during visitation. pt's wife mentioned that pt's mother also had depression, and pt's brother has bipolar. This information was not mentioned by pt as he only mentioned his sister having schizophrenia. Pt's wife mentioned that the pressure of the job has gotten to him. Both pt and wife do not feel it is good for him to return to his current job. LSW encouraged wife to tell them he would not be there for the rest of the week and then it could be figured out a way to tell them he would not be returning. 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 LSW will follow pt and assist as needed regarding employment concerns.    DHARA GreenW

## 2024-11-05 LAB
GLUCOSE BLD MANUAL STRIP-MCNC: 139 MG/DL (ref 74–99)
GLUCOSE BLD MANUAL STRIP-MCNC: 164 MG/DL (ref 74–99)

## 2024-11-05 PROCEDURE — 2500000002 HC RX 250 W HCPCS SELF ADMINISTERED DRUGS (ALT 637 FOR MEDICARE OP, ALT 636 FOR OP/ED): Performed by: INTERNAL MEDICINE

## 2024-11-05 PROCEDURE — 1140000001 HC PRIVATE PSYCH ROOM DAILY

## 2024-11-05 PROCEDURE — 82947 ASSAY GLUCOSE BLOOD QUANT: CPT

## 2024-11-05 PROCEDURE — 2500000001 HC RX 250 WO HCPCS SELF ADMINISTERED DRUGS (ALT 637 FOR MEDICARE OP): Performed by: PSYCHIATRY & NEUROLOGY

## 2024-11-05 PROCEDURE — 99231 SBSQ HOSP IP/OBS SF/LOW 25: CPT | Performed by: REGISTERED NURSE

## 2024-11-05 PROCEDURE — 99233 SBSQ HOSP IP/OBS HIGH 50: CPT | Performed by: INTERNAL MEDICINE

## 2024-11-05 PROCEDURE — 2500000001 HC RX 250 WO HCPCS SELF ADMINISTERED DRUGS (ALT 637 FOR MEDICARE OP): Performed by: INTERNAL MEDICINE

## 2024-11-05 RX ORDER — GLIPIZIDE 2.5 MG/1
2.5 TABLET, EXTENDED RELEASE ORAL
Status: DISCONTINUED | OUTPATIENT
Start: 2024-11-05 | End: 2024-11-08 | Stop reason: HOSPADM

## 2024-11-05 ASSESSMENT — PAIN - FUNCTIONAL ASSESSMENT
PAIN_FUNCTIONAL_ASSESSMENT: 0-10
PAIN_FUNCTIONAL_ASSESSMENT: 0-10

## 2024-11-05 ASSESSMENT — PAIN SCALES - GENERAL
PAINLEVEL_OUTOF10: 0 - NO PAIN
PAINLEVEL_OUTOF10: 0 - NO PAIN

## 2024-11-05 ASSESSMENT — COLUMBIA-SUICIDE SEVERITY RATING SCALE - C-SSRS
1. SINCE LAST CONTACT, HAVE YOU WISHED YOU WERE DEAD OR WISHED YOU COULD GO TO SLEEP AND NOT WAKE UP?: NO
6. HAVE YOU EVER DONE ANYTHING, STARTED TO DO ANYTHING, OR PREPARED TO DO ANYTHING TO END YOUR LIFE?: NO
2. HAVE YOU ACTUALLY HAD ANY THOUGHTS OF KILLING YOURSELF?: NO

## 2024-11-05 NOTE — PROGRESS NOTES
[facility]: [facility]  PROGRESS NOTE      Juwan Arana is a 59 y.o. male that is being seen  today for for follow-up of [facility].  Subjective   Patient is a 59-year-old male with history of hypertension, diabetes, depression and anxiety and hyperlipidemia who is being seen for follow-up at [facility].  Patient's blood pressure is fairly controlled.  Patient's blood sugars have been elevated.  Patient is on metformin and Farxiga.  Patient is being started on glipizide 2.5 mg daily.  Will continue to monitor blood sugars.  Diabetic diet discussed with the patient.      ROS  Negative for fever or chills  Negative for sore throat, ear pain, nasal discharge  Negative for cough, shortness of breath or wheezing  Negative for chest pain, palpitations, swelling of legs  Negative for abdominal pain, constipation, diarrhea, blood in the stools  Negative for urinary complaints  Negative for headache, dizziness, weakness or numbness  Negative for joint pain  Positive for depression and anxiety  All other systems reviewed and were negative   Vitals:    11/05/24 0817   BP: 142/80   Pulse: 103   Resp:    Temp:    SpO2:       Vitals:    11/03/24 1735   Weight: 83.8 kg (184 lb 12.8 oz)     Body mass index is 25.77 kg/m².  Physical Exam  Constitutional: Patient does not appear to be in any acute distress  Head and Face: NCAT  Eyes: Normal external exam, EOMI  ENT: Normal external inspection of ears and nose. Oropharynx normal.  Cardiovascular: RRR, S1/S2, no murmurs, rubs, or gallops, radial pulses +2, no edema of extremities  Pulmonary: CTAB, no respiratory distress.  Abdomen: +BS, soft, non-tender, nondistended, no guarding or rebound, no masses noted  MSK: No joint swelling, normal movements of all extremities. Range of motion- normal.  Skin- No lesions, contusions, or erythema.  Peripheral puslses palpable bilaterally 2+  Neuro: AAO X3, Cranial nerves 2-12 grossly intact,DTR 2+ in all 4 limbs    Psychiatric: Judgment intact. Appropriate mood and behavior    LABS   [unfilled]  Lab Results   Component Value Date    GLUCOSE 172 (H) 11/02/2024    CALCIUM 9.3 11/02/2024     11/02/2024    K 4.2 11/02/2024    CO2 18 (L) 11/02/2024     11/02/2024    BUN 15 11/02/2024    CREATININE 0.99 11/02/2024     Lab Results   Component Value Date    ALT 25 11/02/2024    AST 17 11/02/2024    ALKPHOS 31 (L) 11/02/2024    BILITOT 1.1 11/02/2024     Lab Results   Component Value Date    WBC 4.7 11/02/2024    HGB 16.5 11/02/2024    HCT 50.0 11/02/2024    MCV 83 11/02/2024     11/02/2024     Lab Results   Component Value Date    CHOL 217 (H) 11/02/2024     Lab Results   Component Value Date    HDL 49.6 11/02/2024     Lab Results   Component Value Date    LDLCALC 128 (H) 11/02/2024     Lab Results   Component Value Date    TRIG 197 (H) 11/02/2024     Lab Results   Component Value Date    HGBA1C 9.9 (H) 11/02/2024     Other labs not included in the list above were reviewed either before or during this encounter.    History    Past Medical History:   Diagnosis Date    Anxiety     Diabetes mellitus (Multi)      No past surgical history on file.  No family history on file.  No Known Allergies  No current facility-administered medications on file prior to encounter.     Current Outpatient Medications on File Prior to Encounter   Medication Sig Dispense Refill    dapagliflozin-saxagliptin (Qtern) 5-5 mg tablet Take 1 tablet by mouth once daily.      Farxiga 10 mg Take 1 tablet (10 mg) by mouth once daily with breakfast.      lisinopril 10 mg tablet Take 1 tablet (10 mg) by mouth once daily.      metFORMIN (Glucophage) 1,000 mg tablet Take 1 tablet (1,000 mg) by mouth 2 times daily (morning and late afternoon).      pravastatin (Pravachol) 10 mg tablet Take 0.5 tablets (5 mg) by mouth once daily.      [DISCONTINUED] PARoxetine (Paxil) 10 mg tablet Take 1 tablet (10 mg) by mouth once daily in the morning.         There  is no immunization history on file for this patient.  Patient's medical history was reviewed and updated either before or during this encounter.  ASSESSMENT / PLAN:  Active Hospital Problems    Type 2 diabetes mellitus without complication, without long-term current use of insulin (Multi)      Mixed hyperlipidemia      Primary hypertension      *Severe recurrent major depression without psychotic features (Multi)       Patient is being seen for follow-up at [facility].  Patient blood sugars are not well-controlled.  Patient is started on extended release glipizide 2.5 mg in addition to metformin and Farxiga.  Will continue to monitor blood sugars.  Blood pressure has been fairly controlled.  Denies any other complaints.      Mik Mascorro MD

## 2024-11-05 NOTE — NURSING NOTE
PHILIP NOTE     Problem:  Depression     Behavior:  Pt reports his mood is good today, he denies suicidal ideation.  He is taking scheduled medications and going to group.  Group Participation: yes  Appetite/Meals: good       Interventions:  1:1 interaction with active listening, give scheduled medications, every 15 minute checks    Response:  Pt denies suicidal ideation, he states his mood is good.  He is interacting with staff and expressing his needs and interacting with patients.     Plan:  1:1 interaction with active listening, give scheduled medications, every 15 minute checks

## 2024-11-05 NOTE — GROUP NOTE
Group Topic: Goals   Group Date: 11/4/2024  Start Time: 2000  End Time: 2018  Facilitators: Marissa Pascal   Department: [facility]     Number of Participants: 6   Group Focus: goals  Treatment Modality: Other: PCA  Interventions utilized were reminiscence  Purpose: feelings, self-worth, and self-care    Name: Juwan Arana YOB: 1965   MR: [id number]      Facilitator: [facility]  Level of Participation: active  Quality of Participation: appropriate/pleasant, attentive, and cooperative  Interactions with others: appropriate, gave feedback, and supportive  Mood/Affect: positive  Triggers (if applicable): N/A  Cognition: coherent/clear  Progress: Moderate  Comments: Pt problem is depression.  Plan: continue with services

## 2024-11-05 NOTE — GROUP NOTE
Group Topic: Self-Care/Wellness   Group Date: 11/5/2024  Start Time: 1110  End Time: 1145  Facilitators: QUINTON Zee   Department: [facility] REHABTH VIRTUAL    Number of Participants: 8   Group Focus: other Healthy Living Group  Treatment Modality: Other: Recreation Therapy  Interventions utilized were exploration, group exercise, patient education, problem solving, and support  Purpose: coping skills, maladaptive thinking, feelings, irrational fears, communication skills, insight or knowledge, self-worth, self-care, relapse prevention strategies, and trigger / craving management    Name: Juwan Arana YOB: 1965   MR: [id number]      Facilitator: Recreational Therapist  Level of Participation: moderate  Quality of Participation: appropriate/pleasant, attentive, and cooperative  Interactions with others: appropriate and gave feedback  Mood/Affect: appropriate and blunted  Triggers (if applicable): n/a  Cognition: coherent/clear  Progress: Significant  Comments: pt problem is depressed mood.  Pt joins group with little encouragement.  Engages with some prompting and is appropriate with comments and responses.    Plan: continue with services

## 2024-11-05 NOTE — GROUP NOTE
Group Topic: Other   Group Date: 11/5/2024  Start Time: 1340  End Time: 1430  Facilitators: QUINTON Zee   Department: [facility] REHABTH [facility]IRTUAL    Number of Participants: 8   Group Focus: other ASAP Quick Thinking Game  Treatment Modality: Other: Recreation Therapy  Interventions utilized were mental fitness  Purpose: other: fun, elevate mood, enhance self esteem , increase socialization, stimulate memory    Name: Juwan Arana YOB: 1965   MR: [id number]      Facilitator: Recreational Therapist  Level of Participation: active  Quality of Participation: appropriate/pleasant, attentive, and cooperative  Interactions with others: appropriate and gave feedback  Mood/Affect: appropriate and blunted  Triggers (if applicable): n/a  Cognition: coherent/clear  Progress: Significant  Comments: pt problem is depressed mood.  Pt continues to display cooperative and appropriate participation in group game.   Plan: continue with services

## 2024-11-05 NOTE — PROGRESS NOTES
"Juwan Arana is a 59 y.o. male on day 2 of admission presenting with Severe recurrent major depression without psychotic features (Multi).      Subjective   Case discussed in morning team and chart reviewed.  Patient slept ok appetite is good. He is attending [facility] and denies current SI.  He denies adverse side effects from lexapro.       Objective     Last Recorded Vitals  Blood pressure 142/80, pulse 103, temperature 35.9 °C (96.6 °F), temperature source Temporal, resp. rate 18, height 1.803 m (5' 11\"), weight 83.8 kg (184 lb 12.8 oz), SpO2 99%.    Review of Systems    Psychiatric ROS - Adult  Anxiety: General Anxiety Disorder (ALBERTA)ALBERTA Behaviors: difficult to control worry, excessive anxiety/worry, difficulty concentrating, restlessness, and sleep disturbance  Depression: anhedonia, appetite decreased, concentration, energy, guilt, helpless, hopeless, persistent thoughts of death, sleep decreased , suicidal thoughts, and withdrawn  Delirium: negative  Psychosis: negative  Tamela: negative  Safety Issues: passive death wish, suicidal ideation, and weapon (gun) in home  Psychiatric ROS Comment: as noted  Physical Exam      Mental Status Exam  General Appearance: Age appearing male wearing hospital provided garments good eye contact  Gait/Station: Ambulating steadily without need for assistance, no abnormal movements observed including tremor or extrapyramidal features  Speech: Normal rate, volume, prosody  Mood: \" a little better\"  Affect: Constricted and Congruent with mood and topic of conversation  Thought Process: Linear, goal directed  Thought Associations: No loosening of associations  Thought Content: suicidal  Perception: No perceptual abnormalities noted  Level of Consciousness: Alert  Orientation: Alert and oriented to person, place, time and situation  Attention and Concentration: Intact  Recent Memory: Intact as evidenced by ability to recall details from the past 24 hours   Remote Memory: Intact as " evidenced by ability to recall previous medical issues   Executive function: Intact  Language: Naming intact  Fund of knowledge: Good  Insight: Fair, as evidenced by recognition of mental health crisis and decompensation and benefit of treatment  Judgment: Fair, as evidenced by help-seeking behavior, ability to reason through medical decision making, compliance with treatment recommendations, and desire to cause harm to self      Psychiatric Risk Assessment  Violence Risk Assessment: major mental illness, male, and presence of firearms in home  Acute Risk of Harm to Others is Considered: low   Suicide Risk Assessment: access to weapons, , current psychiatric illness, feelings of hopelessness, male, presence of firearms in home, suicidal ideations, and suicidal plans  Protective Factors against Suicide: adherence to  treatment, employment, hopefulness/future orientation, marriage/partnership, moral objections to suicide, positive family relationships, and sense of responsibility toward family  Acute Risk of Harm to Self is Considered: moderate    Relevant Results               Assessment/Plan   Assessment & Plan  Severe recurrent major depression without psychotic features (Multi)    Type 2 diabetes mellitus without complication, without long-term current use of insulin (Multi)    Mixed hyperlipidemia    Primary hypertension    59-year-old male with history of depression admitted in the setting of suicidality and exacerbated depression of at least 1 month duration related to work stressors.  Patient endorsing symptoms including trouble with sleep, reduced interest, increased guilt, low energy, decreased appetite with weight loss of 18 lbs, suicidal thoughts with plan with methods and access to these methods.  Patient reaching out for assistance and brought into the emergency department for referral and admission to psychiatric hospital.  Patient willing to have medications changed and adjusted well in the  hospital and remain admitted voluntarily pending improvement.  Patient will benefit from the admission for safety stabilization and monitoring.     Biological -          As such we will initiate Lexapro 5 mg once today for depression and anxiety and then titrating to 10 mg daily starting tomorrow and thereafter for treatment of depression anxiety.   11/6/24 Patient denies adverse side effects of lexapro- cont current meds  Patient also made aware that trazodone will be available as a as needed to help with sleep related to mood disorder.  Initial dose of 25 mg can be titrated.     Medical to please co-manage those pertinences      Discussion with patient regarding risk benefits and alternatives and side effects with opportunity to ask questions and questions answered.  Patient given consent to the plan as noted     2. Psychological -        Patient is encouraged to participate with the therapeutic milieu and program and group therapy        3. Sociological -       Patient encouraged to cooperate with social work staff on issues relevant to discharge planning        Goals for discharge include:  Psychiatric condition: to lower acute risk, return to baseline level of functioning, work to identify positive coping skills to manage psychiatric symptoms, allow for reconciliation of medications  Physical condition: increase daily physical activity, demonstrate interest in completing daily activity, and complete Activities of Daily Living  Social function: identify protective factors and family supports, increase social interactions on the unit with peers and providing staff, and demonstrate appropriate problem solving skills for engaging after care on discharge                       I spent 20 minutes in the professional and overall care of this patient.      Becca Bennett, SADIE-CNS

## 2024-11-05 NOTE — GROUP NOTE
Group Topic: Medication Education   Group Date: 11/5/2024  Start Time: 1005  End Time: 1105  Facilitators: Shayla Sims PharmD   Department: [facility]    Number of Participants: 7   Group Focus: daily focus and medication education  Treatment Modality: Skills Training  Interventions utilized were group exercise, other Bingo Game, and patient education  Purpose: insight or knowledge    Name: Juwan Arana YOB: 1965   MR: [id number]      Facilitator: Pharmacist  Level of Participation: active  Quality of Participation: appropriate/pleasant, attentive, and cooperative  Interactions with others: appropriate  Mood/Affect: appropriate  Triggers (if applicable): n/a  Cognition: coherent/clear  Progress: Gaining insight or knowledge  Comments: Juwan Arana attended the general medication education group today. We played [employer].  Juwan Arana participated in the game by covering the topics discussed on the Bingo board and answering questions when asked of the group as a whole.  Juwan Arana was quiet but seemed fully engaged in group.    Plan: continue with services

## 2024-11-05 NOTE — CARE PLAN
Problem: Risk for Suicide  Goal: Accepts medications as prescribed/needed this shift  Outcome: Progressing     Problem: Risk for Suicide  Goal: Makes needs known through verbalization or behaviors this shift  Outcome: Progressing   The patient's goals for the shift include go to groups    The clinical goals for the shift include encourage participation in milieu    Over the shift, the patient did make progress toward the following goals. Barriers to progression include Depression. Recommendations to address these barriers include encourage patient to take scheduled medications, go to groups and discuss in team meeting.

## 2024-11-05 NOTE — PROGRESS NOTES
LSW contacted available treatment locations near pt and scheduled follow up appointments through [facility]. Appointments are as follows: Psychiatry with Erika Peralta MD 11/20/24 at 2pm in person at the [place] location. As well as counseling with Mckenzie Baum 11/27/24 at 12pm telehealth.     DHARA Green

## 2024-11-05 NOTE — NURSING NOTE
PHILIP NOTE     Problem:  Depression     Behavior:  Patient is in the group room sitting in a chair watching television. Patient is pleasant and calm. Patient’s affect is blunted. Patient is talkative. Patient maintains good eye contact    Group Participation: N/A  Appetite/Meals: N/A       Interventions:  This nurse completed a shift assessment.    Response:  Patient remained pleasant and calm and was cooperative throughout this shift assessment.     Plan:  Continue to monitor for depression. Continue to monitor for patient safety.

## 2024-11-06 LAB
GLUCOSE BLD MANUAL STRIP-MCNC: 116 MG/DL (ref 74–99)
GLUCOSE BLD MANUAL STRIP-MCNC: 152 MG/DL (ref 74–99)

## 2024-11-06 PROCEDURE — 1140000001 HC PRIVATE PSYCH ROOM DAILY

## 2024-11-06 PROCEDURE — 99231 SBSQ HOSP IP/OBS SF/LOW 25: CPT | Performed by: REGISTERED NURSE

## 2024-11-06 PROCEDURE — 2500000001 HC RX 250 WO HCPCS SELF ADMINISTERED DRUGS (ALT 637 FOR MEDICARE OP): Performed by: PSYCHIATRY & NEUROLOGY

## 2024-11-06 PROCEDURE — 2500000002 HC RX 250 W HCPCS SELF ADMINISTERED DRUGS (ALT 637 FOR MEDICARE OP, ALT 636 FOR OP/ED): Performed by: INTERNAL MEDICINE

## 2024-11-06 PROCEDURE — 2500000001 HC RX 250 WO HCPCS SELF ADMINISTERED DRUGS (ALT 637 FOR MEDICARE OP): Performed by: INTERNAL MEDICINE

## 2024-11-06 PROCEDURE — 82947 ASSAY GLUCOSE BLOOD QUANT: CPT

## 2024-11-06 ASSESSMENT — PAIN SCALES - GENERAL
PAINLEVEL_OUTOF10: 0 - NO PAIN

## 2024-11-06 ASSESSMENT — PAIN - FUNCTIONAL ASSESSMENT: PAIN_FUNCTIONAL_ASSESSMENT: 0-10

## 2024-11-06 NOTE — CARE PLAN
The patient's goals for the shift include go to groups    The clinical goals for the shift include encourage participation in milieu

## 2024-11-06 NOTE — PROGRESS NOTES
"Juwan Arana is a 59 y.o. male on day 3 of admission presenting with Severe recurrent major depression without psychotic features (Multi).      Subjective   Case discussed in morning team and chart reviewed.  Patient slept ok appetite is good. He is attending [facility] and denies current SI.  He denies adverse side effects from lexapro. He says he is feeling significantly better and is looking forward to discharge.       Objective     Last Recorded Vitals  Blood pressure 135/80, pulse 100, temperature 36.8 °C (98.2 °F), temperature source Temporal, resp. rate 18, height 1.803 m (5' 11\"), weight 85.7 kg (188 lb 15 oz), SpO2 98%.    Review of Systems    Psychiatric ROS - Adult  Anxiety: General Anxiety Disorder (ALBERTA)ALBERTA Behaviors: difficult to control worry, excessive anxiety/worry, difficulty concentrating, restlessness, and sleep disturbance  Depression: anhedonia, appetite decreased, concentration, energy, guilt, helpless, hopeless, persistent thoughts of death, sleep decreased , suicidal thoughts, and withdrawn  Delirium: negative  Psychosis: negative  Tamela: negative  Safety Issues: passive death wish, suicidal ideation, and weapon (gun) in home  Psychiatric ROS Comment: as noted  Physical Exam      Mental Status Exam  General Appearance: Age appearing male wearing hospital provided garments good eye contact  Gait/Station: Ambulating steadily without need for assistance, no abnormal movements observed including tremor or extrapyramidal features  Speech: Normal rate, volume, prosody  Mood: \"quite a bit better\"  Affect: Constricted and Congruent with mood and topic of conversation  Thought Process: Linear, goal directed  Thought Associations: No loosening of associations  Thought Content: suicidal  Perception: No perceptual abnormalities noted  Level of Consciousness: Alert  Orientation: Alert and oriented to person, place, time and situation  Attention and Concentration: Intact  Recent Memory: Intact as evidenced " by ability to recall details from the past 24 hours   Remote Memory: Intact as evidenced by ability to recall previous medical issues   Executive function: Intact  Language: Naming intact  Fund of knowledge: Good  Insight: Fair, as evidenced by recognition of mental health crisis and decompensation and benefit of treatment  Judgment: Fair, as evidenced by help-seeking behavior, ability to reason through medical decision making, compliance with treatment recommendations, and desire to cause harm to self      Psychiatric Risk Assessment  Violence Risk Assessment: major mental illness, male, and presence of firearms in home  Acute Risk of Harm to Others is Considered: low   Suicide Risk Assessment: access to weapons, , current psychiatric illness, feelings of hopelessness, male, presence of firearms in home, suicidal ideations, and suicidal plans  Protective Factors against Suicide: adherence to  treatment, employment, hopefulness/future orientation, marriage/partnership, moral objections to suicide, positive family relationships, and sense of responsibility toward family  Acute Risk of Harm to Self is Considered: moderate    Relevant Results               Assessment/Plan   Assessment & Plan  Severe recurrent major depression without psychotic features (Multi)    Type 2 diabetes mellitus without complication, without long-term current use of insulin (Multi)    Mixed hyperlipidemia    Primary hypertension    59-year-old male with history of depression admitted in the setting of suicidality and exacerbated depression of at least 1 month duration related to work stressors.  Patient endorsing symptoms including trouble with sleep, reduced interest, increased guilt, low energy, decreased appetite with weight loss of 18 lbs, suicidal thoughts with plan with methods and access to these methods.  Patient reaching out for assistance and brought into the emergency department for referral and admission to psychiatric  hospital.  Patient willing to have medications changed and adjusted well in the hospital and remain admitted voluntarily pending improvement.  Patient will benefit from the admission for safety stabilization and monitoring.     Biological -          As such we will initiate Lexapro 5 mg once today for depression and anxiety and then titrating to 10 mg daily starting tomorrow and thereafter for treatment of depression anxiety.   11/5/24 Patient denies adverse side effects of lexapro- cont current meds  11/6/24 Cont current meds  Patient also made aware that trazodone will be available as a as needed to help with sleep related to mood disorder.  Initial dose of 25 mg can be titrated.     Medical to please co-manage those pertinences      Discussion with patient regarding risk benefits and alternatives and side effects with opportunity to ask questions and questions answered.  Patient given consent to the plan as noted     2. Psychological -        Patient is encouraged to participate with the therapeutic milieu and program and group therapy        3. Sociological -       Patient encouraged to cooperate with social work staff on issues relevant to discharge planning        Goals for discharge include:  Psychiatric condition: to lower acute risk, return to baseline level of functioning, work to identify positive coping skills to manage psychiatric symptoms, allow for reconciliation of medications  Physical condition: increase daily physical activity, demonstrate interest in completing daily activity, and complete Activities of Daily Living  Social function: identify protective factors and family supports, increase social interactions on the unit with peers and providing staff, and demonstrate appropriate problem solving skills for engaging after care on discharge                       I spent 20 minutes in the professional and overall care of this patient.      Becca Bennett, APRN-CNS

## 2024-11-06 NOTE — GROUP NOTE
Group Topic: Other   Group Date: 11/6/2024  Start Time: 1330  End Time: 1430  Facilitators: QUINTON Zee   Department: [facility] REHABTH VIRTUAL    Number of Participants: 9   Group Focus: other Can You Name 5?  Treatment Modality: Other: Recreation Therapy  Interventions utilized were mental fitness  Purpose: other: fun, elevate mood, increase socialization, enhance self esteem ,stimulate memory    Name: Juwan Arana YOB: 1965   MR: [id number]      Facilitator: Recreational Therapist  Level of Participation: active  Quality of Participation: appropriate/pleasant, attentive, and cooperative  Interactions with others: appropriate and gave feedback  Mood/Affect: appropriate and brightens with interaction  Triggers (if applicable): n/a  Cognition: coherent/clear  Progress: Significant  Comments: pt problem is depressed mood.  Pt continues to display appropriate and active participation.    Plan: continue with services

## 2024-11-06 NOTE — NURSING NOTE
BIRP NOTE     Problem:  SI     Behavior:  Denies any SI this shift  Group Participation: yes  Appetite/Meals: 100x3       Interventions:  Advised to seek staff with any concerns or issues    Response:  Pt states he understands to seek staff w concerns     Plan:  Will continue to monitor

## 2024-11-06 NOTE — GROUP NOTE
Group Topic: Cognitive Behavioral Therapy   Group Date: 11/6/2024  Start Time: 1100  End Time: 1145  Facilitators: MYRTLE Bejarano   Department: [facility] CARE TRANSITIONS VIRTUAL    Number of Participants: 7   Group Focus: communication  Treatment Modality: Cognitive Behavioral Therapy  Interventions utilized were assignment  Purpose: coping skills    Name: Juwan Arana YOB: 1965   MR: [id number]      Facilitator:   Level of Participation: active  Quality of Participation: appropriate/pleasant  Interactions with others: appropriate  Mood/Affect: appropriate  Triggers (if applicable): n/a  Cognition: coherent/clear  Progress: Minimal  Comments: Tawanda shared positive thoughts with group and engaged in open discussion with encouragement.   Plan: continue with services

## 2024-11-06 NOTE — GROUP NOTE
Group Topic: Goals   Group Date: 11/6/2024  Start Time: 0730  End Time: 0800  Facilitators: Jazmin Levy   Department: [facility] Geriatric     Number of Participants: 9   Group Focus: goals  Treatment Modality: Individual Therapy  Interventions utilized were support  Purpose: feelings, self-worth, and self-care    Name: Juwan Arana YOB: 1965   MR: [id number]      Facilitator: Mental Health [facility]  Level of Participation: minimal  Quality of Participation: appropriate/pleasant  Interactions with others: appropriate  Mood/Affect: appropriate  Triggers (if applicable): N/A  Cognition: coherent/clear  Progress: Minimal  Comments: Pt problem is depression   Plan: continue with services

## 2024-11-06 NOTE — GROUP NOTE
Group Topic: Coping Skills   Group Date: 11/6/2024  Start Time: 1000  End Time: 1100  Facilitators: QUINTON Zee   Department: [facility] REHABTH VIRTUAL    Number of Participants: 10   Group Focus: other Coping Skills Group  Treatment Modality: Other: Recreation Therapy  Interventions utilized were exploration, group exercise, patient education, problem solving, and support  Purpose: coping skills, maladaptive thinking, feelings, irrational fears, communication skills, insight or knowledge, self-worth, self-care, relapse prevention strategies, and trigger / craving management    Name: Juwan Arana YOB: 1965   MR: [id number]      Facilitator: Recreational Therapist  Level of Participation: active  Quality of Participation: appropriate/pleasant, attentive, and cooperative  Interactions with others: appropriate and gave feedback  Mood/Affect: appropriate and blunted  Triggers (if applicable): n/a  Cognition: coherent/clear  Progress: Significant  Comments: pt problem is depressed mood.  Pt joins group with little encouragement.  Pleasant, cooperative and active with participation.  Plan: continue with services

## 2024-11-06 NOTE — NURSING NOTE
PHILIP NOTE     Problem:  SI     Behavior:  Pt is observed seated in tv room with several other pts watching Presidential Election in the tv room.         Interventions:  Pt was engaged in 1:1 briefly.    Response:  Pt was mostly focused watching election. No change.     Plan:  Continue to assess for SI.

## 2024-11-06 NOTE — GROUP NOTE
Group Topic: Goals   Group Date: 11/5/2024  Start Time: 2001  End Time: 2021  Facilitators: Marissa Pascal   Department: [facility] Geriatric     Number of Participants: 6   Group Focus: goals  Treatment Modality: Other: PCA  Interventions utilized were reminiscence  Purpose: feelings, self-worth, and self-care    Name: Juwan Arana YOB: 1965   MR: [id number]      Facilitator: Mental Health PCNA  Level of Participation: active  Quality of Participation: appropriate/pleasant, attentive, and cooperative  Interactions with others: appropriate, gave feedback, and supportive  Mood/Affect: positive  Triggers (if applicable): N/A  Cognition: coherent/clear  Progress: Moderate  Comments: Pt problem is depression.  Plan: continue with services

## 2024-11-07 LAB
GLUCOSE BLD MANUAL STRIP-MCNC: 157 MG/DL (ref 74–99)
GLUCOSE BLD MANUAL STRIP-MCNC: 162 MG/DL (ref 74–99)

## 2024-11-07 PROCEDURE — RXMED WILLOW AMBULATORY MEDICATION CHARGE

## 2024-11-07 PROCEDURE — 2500000001 HC RX 250 WO HCPCS SELF ADMINISTERED DRUGS (ALT 637 FOR MEDICARE OP): Performed by: PSYCHIATRY & NEUROLOGY

## 2024-11-07 PROCEDURE — 2500000002 HC RX 250 W HCPCS SELF ADMINISTERED DRUGS (ALT 637 FOR MEDICARE OP, ALT 636 FOR OP/ED): Performed by: INTERNAL MEDICINE

## 2024-11-07 PROCEDURE — 2500000001 HC RX 250 WO HCPCS SELF ADMINISTERED DRUGS (ALT 637 FOR MEDICARE OP): Performed by: INTERNAL MEDICINE

## 2024-11-07 PROCEDURE — 1140000001 HC PRIVATE PSYCH ROOM DAILY

## 2024-11-07 PROCEDURE — 82947 ASSAY GLUCOSE BLOOD QUANT: CPT

## 2024-11-07 PROCEDURE — 99232 SBSQ HOSP IP/OBS MODERATE 35: CPT | Performed by: PSYCHIATRY & NEUROLOGY

## 2024-11-07 RX ORDER — ESCITALOPRAM OXALATE 10 MG/1
10 TABLET ORAL DAILY
Qty: 30 TABLET | Refills: 0 | Status: SHIPPED | OUTPATIENT
Start: 2024-11-07

## 2024-11-07 RX ORDER — GLIPIZIDE 2.5 MG/1
2.5 TABLET, EXTENDED RELEASE ORAL
Qty: 30 TABLET | Refills: 0 | Status: SHIPPED | OUTPATIENT
Start: 2024-11-07

## 2024-11-07 ASSESSMENT — PAIN SCALES - GENERAL
PAINLEVEL_OUTOF10: 0 - NO PAIN

## 2024-11-07 ASSESSMENT — PAIN - FUNCTIONAL ASSESSMENT
PAIN_FUNCTIONAL_ASSESSMENT: 0-10

## 2024-11-07 ASSESSMENT — COLUMBIA-SUICIDE SEVERITY RATING SCALE - C-SSRS
6. HAVE YOU EVER DONE ANYTHING, STARTED TO DO ANYTHING, OR PREPARED TO DO ANYTHING TO END YOUR LIFE?: NO
2. HAVE YOU ACTUALLY HAD ANY THOUGHTS OF KILLING YOURSELF?: NO
1. SINCE LAST CONTACT, HAVE YOU WISHED YOU WERE DEAD OR WISHED YOU COULD GO TO SLEEP AND NOT WAKE UP?: NO

## 2024-11-07 NOTE — NURSING NOTE
PHILIP NOTE     Problem:  SI     Behavior:  Pt is in tv room with other pts watching tv. He denies SI to this nurse. No behaviors noted.         Interventions:  No interventions needed at this time.    Response:  No change     Plan:  Continue to assess for SI

## 2024-11-07 NOTE — NURSING NOTE
BIRP NOTE     Problem:  SI     Behavior:  Denies any SI, depression or anxiety this shift.  Group Participation: yes  Appetite/Meals: 100x3       Interventions:  Advised to seek staff w any issues or concerns    Response:  Pt states he understands to seek staff w any issues or concerns     Plan:  Will continue to monitor

## 2024-11-07 NOTE — PROGRESS NOTES
"Juwan Arana is a 59 y.o. male on day 4 of admission presenting with Severe recurrent major depression without psychotic features (Multi).      Subjective   Chart reviewed and case discussed with multidisciplinary treatment team.    Patient reports he is doing much better feeling \"back to my usual self.\"  He describes that \"the veil has lifted.\"  He notes he is not feeling depressed or suicidal, has an out look on life that is positive, sleeping well, eating well, tending to hygiene.  He further denies thoughts of harming others and remains absent of psychotic and manic features.  We discussed the patient transitioning back to the community tomorrow with outpatient follow-up.  He is tolerating Lexapro initiation without issue and reports that he believes his improvement is due to the use of the medication.  He is optimistic and hopeful for the future and feeling well supported to return home safely tomorrow.  He has no other acute concerns when given opportunity.  Plan is to discharge tomorrow afternoon.       Objective     Last Recorded Vitals  Blood pressure 110/71, pulse 88, temperature 36.6 °C (97.9 °F), temperature source Temporal, resp. rate 16, height 1.803 m (5' 11\"), weight 85.7 kg (188 lb 15 oz), SpO2 99%.    Review of Systems    Psychiatric ROS - Adult  Anxiety: General Anxiety Disorder (ALBERTA)ALBERTA Behaviors: difficult to control worry, excessive anxiety/worry, difficulty concentrating, restlessness, and sleep disturbance  Depression: anhedonia, appetite decreased, concentration, energy, guilt, helpless, hopeless, persistent thoughts of death, sleep decreased , suicidal thoughts, and withdrawn  Delirium: negative  Psychosis: negative  Tamela: negative  Safety Issues: passive death wish, suicidal ideation, and weapon (gun) in home  Psychiatric ROS Comment: as noted    Physical Exam      Mental Status Exam  General Appearance: Age appearing male wearing personal garments good eye contact  Gait/Station: " "Ambulating steadily without need for assistance, no abnormal movements observed including tremor or extrapyramidal features  Speech: Normal rate, volume, prosody  Mood: \"I'm feeling like my usual self\"  Affect: well mobilized, Congruent with mood and topic of conversation  Thought Process: Linear, goal directed  Thought Associations: No loosening of associations  Thought Content: denies thoughts of harming self, others  Perception: No perceptual abnormalities noted  Level of Consciousness: Alert  Orientation: Alert and oriented to person, place, time and situation  Attention and Concentration: Intact  Recent Memory: Intact as evidenced by ability to recall details from the past 24 hours   Remote Memory: Intact as evidenced by ability to recall previous medical issues   Executive function: Intact  Language: Naming intact  Fund of knowledge: Good  Insight: Fair, as evidenced by recognition of mental health crisis and decompensation and benefit of treatment  Judgment: Fair, as evidenced by help-seeking behavior, ability to reason through medical decision making, compliance with treatment recommendations, and desire to cause harm to self      Psychiatric Risk Assessment  Violence Risk Assessment: major mental illness, male, and presence of firearms in home  Acute Risk of Harm to Others is Considered: low   Suicide Risk Assessment: access to weapons, , current psychiatric illness, feelings of hopelessness, male, presence of firearms in home, suicidal ideations, and suicidal plans  Protective Factors against Suicide: adherence to  treatment, employment, hopefulness/future orientation, marriage/partnership, moral objections to suicide, positive family relationships, and sense of responsibility toward family  Acute Risk of Harm to Self is Considered: moderate    Relevant Results             Results for orders placed or performed during the hospital encounter of 11/03/24 (from the past 96 hours)   POCT GLUCOSE "   Result Value Ref Range    POCT Glucose 157 (H) 74 - 99 mg/dL   POCT GLUCOSE   Result Value Ref Range    POCT Glucose 188 (H) 74 - 99 mg/dL   POCT GLUCOSE   Result Value Ref Range    POCT Glucose 164 (H) 74 - 99 mg/dL   POCT GLUCOSE   Result Value Ref Range    POCT Glucose 139 (H) 74 - 99 mg/dL   POCT GLUCOSE   Result Value Ref Range    POCT Glucose 116 (H) 74 - 99 mg/dL   POCT GLUCOSE   Result Value Ref Range    POCT Glucose 152 (H) 74 - 99 mg/dL   POCT GLUCOSE   Result Value Ref Range    POCT Glucose 157 (H) 74 - 99 mg/dL         Assessment/Plan   Assessment & Plan  Severe recurrent major depression without psychotic features (Multi)    Type 2 diabetes mellitus without complication, without long-term current use of insulin (Multi)    Mixed hyperlipidemia    Primary hypertension      59-year-old male with history of depression admitted in the setting of suicidality and exacerbated depression of at least 1 month duration related to work stressors.  Patient endorsing symptoms including trouble with sleep, reduced interest, increased guilt, low energy, decreased appetite with weight loss of 18 lbs, suicidal thoughts with plan with methods and access to these methods.  Patient reaching out for assistance and brought into the emergency department for referral and admission to psychiatric hospital.  Patient willing to have medications changed and adjusted well in the hospital and remain admitted voluntarily pending improvement.  Patient will benefit from the admission for safety stabilization and monitoring.     Biological -      Planning for discharge tomorrow    Continue lexapro 10 mg daily for depression, anxiety     Medical to please co-manage those pertinences      Discussion with patient regarding risk benefits and alternatives and side effects with opportunity to ask questions and questions answered.  Patient given consent to the plan as noted     2. Psychological -        Patient is encouraged to participate  with the therapeutic milieu and program and group therapy        3. Sociological -       Patient encouraged to cooperate with social work staff on issues relevant to discharge planning                    I personally spent 25 minutes today providing care for this patient, including preparation, face to face time, documentation and other services such as review of medical records, diagnostic result, patient education, counseling, coordination of care as specified in the encounter.    Parts of this chart have been completed using voice recognition software.  Please excuse any errors of transcription.  Despite the medical decision making time stamp, my medical decision making has taken place during the patient's entire visit.  Thought process and reason for plan has been formulated from the time that I saw the patient until the time of disposition and is not specific to one specific moment during their visit and furthermore the medical decision making encompasses the entire chart and not only that represented in this note.    Nutrition/Malnutrition:  The diagnosis of malnutrition has significant impact on risk adjustment, mortality and readmission rates, length of stay and hospital reimbursement.  The below is a representation of nutrition status if evaluated.  If blank, there is no associated malnutrition finding to incorporate per the dietician team:               Rambo Montenegro DO

## 2024-11-07 NOTE — PROGRESS NOTES
SHIRAW spoke with pt's wife regarding disposition and discharge. Pt's wife feels that pt is doing great and is back to his usual self. Pt's wife states that he is not worried about things such as his job as he was before, as he is more laid back. Also that he wants to get back to do things and has interest in things that he hadn't been lately. Pt's wife reports that he has decided not to return to his current job and wife plans to call them today and tell them. Pt has another job opportunity available to him and pt's wife is not concerned about him finding a job. LSW and wife discharge discharge tomorrow with  at 3pm.     Amarilis Jung, DHARA JAMA

## 2024-11-07 NOTE — GROUP NOTE
Group Topic: Self-Care/Wellness   Group Date: 11/7/2024  Start Time: 1000  End Time: 1100  Facilitators: QUINTON Zee   Department: [facility]    Number of Participants: 8   Group Focus: other Stop Over thinking!  Treatment Modality: Other: Recreation Therapy  Interventions utilized were exploration, group exercise, patient education, problem solving, and support  Purpose: coping skills, maladaptive thinking, feelings, irrational fears, communication skills, insight or knowledge, self-worth, self-care, relapse prevention strategies, and trigger / craving management    Name: Juwan Arana YOB: 1965   MR: [id number]      Facilitator: Recreational Therapist  Level of Participation: active  Quality of Participation: appropriate/pleasant, attentive, and cooperative  Interactions with others: appropriate and gave feedback  Mood/Affect: appropriate and brightens with interaction  Triggers (if applicable): n/a  Cognition: coherent/clear  Progress: Significant  Comments: pt problem is depressed mood.  Pt joins group with little encouragement.  Engages easily and appropriately.  Active with participation.   Plan: continue with services

## 2024-11-07 NOTE — GROUP NOTE
Group Topic: Self-Care/Wellness   Group Date: 11/7/2024  Start Time: 1100  End Time: 1145  Facilitators: EVITA Clarke   Department: [facility]    Number of Participants: 9   Group Focus: other self-care, stress management and self-awareness  Treatment Modality: Solution-Focused Therapy  Interventions utilized were patient education  Purpose: coping skills, insight or knowledge, and self-care    Name: Juwan Arana YOB: 1965   MR: [id number]      Facilitator:   Level of Participation: minimal  Quality of Participation: appropriate/pleasant  Interactions with others: appropriate  Mood/Affect: appropriate  Triggers (if applicable): n/a   Cognition: coherent/clear  Progress: Gaining insight or knowledge  Comments: Pt had minimal participation but was appropriate with responses and receptive to information presented.   Plan: continue with services

## 2024-11-07 NOTE — CARE PLAN
The patient's goals for the shift include sleep    The clinical goals for the shift include maintain safety

## 2024-11-07 NOTE — GROUP NOTE
Group Topic: Other   Group Date: 11/7/2024  Start Time: 1330  End Time: 1430  Facilitators: QUINTON Zee   Department: [facility]    Number of Participants: 8   Group Focus: other Tri-Bond Game  Treatment Modality: Other: Recreation Therapy  Interventions utilized were mental fitness  Purpose: other: fun, elevate mood, increase socialization, enhance self esteem, stimulate memory    Name: Juwan Arana YOB: 1965   MR: [id number]      Facilitator: Recreational Therapist  Level of Participation: active  Quality of Participation: appropriate/pleasant, attentive, and cooperative  Interactions with others: appropriate and gave feedback  Mood/Affect: appropriate and brightens with interaction  Triggers (if applicable): n/a  Cognition: coherent/clear  Progress: Significant  Comments: pt problem is depressed mood.  Pt continues to display appropriate and active participation.    Plan: continue with services

## 2024-11-08 ENCOUNTER — PHARMACY VISIT (OUTPATIENT)
Dept: PHARMACY | Facility: CLINIC | Age: 59
End: 2024-11-08
Payer: COMMERCIAL

## 2024-11-08 VITALS
SYSTOLIC BLOOD PRESSURE: 128 MMHG | HEIGHT: 71 IN | RESPIRATION RATE: 18 BRPM | OXYGEN SATURATION: 94 % | TEMPERATURE: 98.6 F | BODY MASS INDEX: 26.45 KG/M2 | DIASTOLIC BLOOD PRESSURE: 77 MMHG | HEART RATE: 82 BPM | WEIGHT: 188.93 LBS

## 2024-11-08 LAB — GLUCOSE BLD MANUAL STRIP-MCNC: 144 MG/DL (ref 74–99)

## 2024-11-08 PROCEDURE — 99233 SBSQ HOSP IP/OBS HIGH 50: CPT | Performed by: INTERNAL MEDICINE

## 2024-11-08 PROCEDURE — 2500000001 HC RX 250 WO HCPCS SELF ADMINISTERED DRUGS (ALT 637 FOR MEDICARE OP): Performed by: INTERNAL MEDICINE

## 2024-11-08 PROCEDURE — 2500000001 HC RX 250 WO HCPCS SELF ADMINISTERED DRUGS (ALT 637 FOR MEDICARE OP): Performed by: PSYCHIATRY & NEUROLOGY

## 2024-11-08 PROCEDURE — 2500000002 HC RX 250 W HCPCS SELF ADMINISTERED DRUGS (ALT 637 FOR MEDICARE OP, ALT 636 FOR OP/ED): Performed by: INTERNAL MEDICINE

## 2024-11-08 PROCEDURE — 82947 ASSAY GLUCOSE BLOOD QUANT: CPT

## 2024-11-08 PROCEDURE — 99238 HOSP IP/OBS DSCHRG MGMT 30/<: CPT | Performed by: PSYCHIATRY & NEUROLOGY

## 2024-11-08 ASSESSMENT — COLUMBIA-SUICIDE SEVERITY RATING SCALE - C-SSRS
2. HAVE YOU ACTUALLY HAD ANY THOUGHTS OF KILLING YOURSELF?: NO
1. SINCE LAST CONTACT, HAVE YOU WISHED YOU WERE DEAD OR WISHED YOU COULD GO TO SLEEP AND NOT WAKE UP?: NO
6. HAVE YOU EVER DONE ANYTHING, STARTED TO DO ANYTHING, OR PREPARED TO DO ANYTHING TO END YOUR LIFE?: NO

## 2024-11-08 ASSESSMENT — PAIN DESCRIPTION - DESCRIPTORS: DESCRIPTORS: PATIENT UNABLE TO DESCRIBE

## 2024-11-08 ASSESSMENT — PAIN - FUNCTIONAL ASSESSMENT: PAIN_FUNCTIONAL_ASSESSMENT: 0-10

## 2024-11-08 ASSESSMENT — ACTIVITIES OF DAILY LIVING (ADL): EFFECT OF PAIN ON DAILY ACTIVITIES: MINIMAL

## 2024-11-08 ASSESSMENT — PAIN SCALES - GENERAL: PAINLEVEL_OUTOF10: 0 - NO PAIN

## 2024-11-08 NOTE — DISCHARGE SUMMARY
"Admit Date: 11/3/2024   Discharge Date: 11/08/24     Reason For Admission:   Active Hospital Problems    Type 2 diabetes mellitus without complication, without long-term current use of insulin (Multi)      Mixed hyperlipidemia      Primary hypertension      *Severe recurrent major depression without psychotic features (Multi)         Discharge Diagnosis  Severe recurrent major depression without psychotic features (Multi)    Issues Requiring Follow-Up  Psychiatric follow up    Test Results Pending At Discharge  Pending Labs       No current pending labs.            Hospital Course   The reason for admission includes: depression worse, difficulty sleeping, feeling depressed, feeling suicidal, stressed at work, and \"I wanted to die\".  Onset of symptoms was gradual starting 1 month ago with gradually worsening course since that time. Psychosocial Stressors: occupational.         Chart reviewed including notes from Care Everywhere and documentation incorporated by reference and direct quotation.     History Of Present Illness  Juwan Arana is a 59 y.o. male.    ED notes:  Pt brought to ED for SI. Pt says he has no suicidal history and that this is new to him, he does have hx of anxiety and takes paxil. Pt says he has been having thoughts of harming himself for the past few months, exacerbated by a new job that he started one month ago. Pt says he took a new job, \"can't handle it\", and can't find a new job. Pt says his thoughts got worse yesterday and started to scare him. Pt says he had thoughts of wanting to crash his car or shoot himself. Pt says his wife removed firearms from the house. Pt is calm and cooperative. Pt is tearful when talking to RN and is seeking help   Patient with a past medical history of depression, anxiety, hypertension presents emergency department for suicidal ideation. Patient started a new job recently. He is been depressed that he about self-harm for the last week. States the thoughts " got worse tonight and this morning where he was thinking about shooting himself. Patient told his wife about his thoughts and he called 911 was brought here for further evaluation. Patient does have a history of anxiety depression, no prior psychiatric hospitalizations. He has had suicidal ideation in the past. Patient's not currently seeing any therapists.      EPAT notes:  58 y/o  male seen via telehealth at [facility] for a psychiatric evaluation. He was brought in by EMS after a call was placed due to pt sharing he was struggling with suicidal ideation with a plan. History of anxiety and depression, currently being monitored by PCP for med management. He denies any drug of alcohol usage, UDS confirmed his reports. Admits to suicidal ideation for the last month with is progressively getting worse over the last few days. Pt started a new job a month ago and it has caused an increase of anxiety and depression. Pt did have a firearm in the home, wife removed today. No history of psychiatric placement. Denies hallucinations, delusions or paranoia.    58 y/o male presents flat, engaged but somber. He shares that he does not feel safe with himself due to the increase of suicidal ideation over the last day. He reports at 3am in the morning he was having thoughts of getting his firearm and shooting himself. Pt explains that after starting his new job he feels overwhelmed. The new employer requires him to learn a few online systems to make orders and he is having a hard time catching on. At times he does not feel supported in learning everything but employer does appear to be patient with him. Pt feels he should know it by now and feels triggered before and after work. He is sleeping roughly 1-2 hours night and has loss 15lbs in the last 3 -4 weeks. “I can't eat or sleep .I don't feel safe, I don't want to hurt myself. I need help” He is chronically on edge and struggling with tremors and managing his racing  thoughts. States he is compliant with medication but it does not help with reducing anxiety at this time.      Unit encounter:  Patient initially sitting in group with sitter at arms length.  Emerged willingly to meet with provider and  in the group room.  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  During this time, he reports additional stressors were placed on him at work and found this unmanageable.  He notes this led to worsening depression including reduced appetite with significant weight loss, trouble with sleep, and resulting suicidality with thoughts of driving his car into a fixed object or using the firearm at home to shoot himself.  He describes that he reached out to his provider was encouraged to come to the hospital and did so.  He notes his goal is to feel better to be less depressed to be able to get back on track.  He reports since coming into the hospital he is already starting to feel better and more safe and is not thinking of harming himself at this time.  He is eager for improvement and willing to participate to this extent including adjustment of medications.  He denies thoughts of harming others and he does not present with features of psychosis and rebeca denying hallucinosis and delusions.  Patient reports he feels able to remain safe on the unit and able to communicate otherwise if he is no longer able to feel safe with understanding that the sitter would be discontinued as such.    11/5:  Case discussed in morning team and chart reviewed. Patient slept ok appetite is good. He is attending roups and denies current SI. He denies adverse side effects from lexapro.     11/6:  Case discussed in morning team and chart reviewed.  Patient slept ok appetite is good. He is attending roups and denies current SI.  He denies adverse side  "effects from lexapro. He says he is feeling significantly better and is looking forward to discharge.     11/7:  Chart reviewed and case discussed with multidisciplinary treatment team.     Patient reports he is doing much better feeling \"back to my usual self.\"  He describes that \"the veil has lifted.\"  He notes he is not feeling depressed or suicidal, has an out look on life that is positive, sleeping well, eating well, tending to hygiene.  He further denies thoughts of harming others and remains absent of psychotic and manic features.  We discussed the patient transitioning back to the community tomorrow with outpatient follow-up.  He is tolerating Lexapro initiation without issue and reports that he believes his improvement is due to the use of the medication.  He is optimistic and hopeful for the future and feeling well supported to return home safely tomorrow.  He has no other acute concerns when given opportunity.  Plan is to discharge tomorrow afternoon.    11/8 - day of discharge:  Patient continues to maintain gains reporting his mood is better feeling less depressed not suicidal.  He is participating in groups going to meals sleeping better and feels with improved energy and again back to his baseline.  He is endorsing feeling able to safely transition from the hospital today and proceed with follow-up as scheduled for next week.  He remains absent of expressed desire to harm others and remains absent of psychotic and manic features.  He further describes that he was raised to stifle emotional responses and not to share them nor seek treatment for them but now recognizes how this has been harmful to him through most of his life recognizing that coming into the hospital allowing him to interact with peers and attend group sessions targeting his symptoms as a much healthier way to mitigate and to manage when he is feeling that he is in crisis.    Discharge Admission Goal Reconciliation    Admission goals " met during stay include reconciliation of medications, treatment of target symptoms, gathering of collateral supportive of discharge, and linking with appropriate level of care in the community.      At discharge, patient encouraged to participate in activity as tolerated, go to all follow up appointments, take all medications as directed, outreach social supports, and utilize crisis safety resources when appropriate.      Risk Assessment at Discharge  Psychiatric:  Patient's psychiatric condition is of lower risk than on admission given the accumulated and maintained gains as described herein.  Functioning now closer to if not at baseline, and patient is further able to identify appropriate and positive coping skills to manage further or recurrent symptoms.      Physical:  Patients physical condition at discharge is reasonable given ability to complete ADLs.      Social:  Social functioning is improved with patient identifying protective factors of family while also demonstrating increased social interactions on the unit and finally demonstrating appropriate problem solving skills for attending aftercare appointments.      Scheduled medications  escitalopram 10 mg tablet  Commonly known as: Lexapro  Take 1 tablet (10 mg) by mouth once daily.  Last time this was given: November 8, 2024  8:07 AM 1 tablet         Farxiga 10 mg  Take 1 tablet (10 mg) by mouth once daily with breakfast.  Last time this was given: November 8, 2024  8:07 AM  Generic drug: dapagliflozin propanediol 1 tablet        glipiZIDE XL 2.5 mg 24 hr tablet  Commonly known as: Glucotrol XL  Take 1 tablet (2.5 mg) by mouth once daily with breakfast. Do not crush, chew, or split.  Last time this was given: November 8, 2024  8:07 AM 1 tablet        lisinopril 10 mg tablet  Take 1 tablet (10 mg) by mouth once daily.  Last time this was given: November 8, 2024  8:07 AM 1 tablet        metFORMIN 1,000 mg tablet  Commonly known as: Glucophage  Take 1 tablet  "(1,000 mg) by mouth 2 times daily (morning and late afternoon).  Last time this was given: November 8, 2024  8:07 AM 1 tablet  1 tablet      pravastatin 10 mg tablet  Commonly known as: Pravachol  Take 0.5 tablets (5 mg) by mouth once daily.  Last time this was given: November 8, 2024  8:07 AM                  Pertinent Physical Exam At Time of Discharge  Physical Exam    Mental Status Exam  General Appearance: Age appearing male wearing personal garments good eye contact  Gait/Station: Ambulating steadily without need for assistance, no abnormal movements observed including tremor or extrapyramidal features  Speech: Normal rate, volume, prosody  Mood: \"I feel good - I've learned so much and know I'm not alone\"  Affect: well mobilized, Congruent with mood and topic of conversation  Thought Process: Linear, goal directed  Thought Associations: No loosening of associations  Thought Content: denies thoughts of harming self, others  Perception: No perceptual abnormalities noted  Level of Consciousness: Alert  Orientation: Alert and oriented to person, place, time and situation  Attention and Concentration: Intact  Recent Memory: Intact as evidenced by ability to recall details from the past 24 hours   Remote Memory: Intact as evidenced by ability to recall previous medical issues   Executive function: Intact  Language: Naming intact  Fund of knowledge: Good  Insight: Fair, as evidenced by recognition of mental health crisis and decompensation and benefit of treatment  Judgment: Fair, as evidenced by help-seeking behavior, ability to reason through medical decision making, compliance with treatment recommendations, and desire to cause harm to self        Outpatient Appointments/Follow-Ups    Pee Peralta MD  [facility]   [address]   [address]   [place] [address]  [phone]  Go on 11/20/2024  Follow up for medication management, at 2pm in person. Please plan for an hour, 40 minutes for " appointment and any paperwork.    Mckenzie Baum, Therapist  [employer]   [phone]  Go on 11/27/2024  Follow up with therapist for counseling services, at 12pm virtually. Please plan for an hour appointment.      Parts of this chart have been completed using voice recognition software.  Please excuse any errors of transcription.  Despite the medical decision making time stamp, my medical decision making has taken place during the patient's entire visit.  Thought process and reason for plan has been formulated from the time that I saw the patient until the time of disposition and is not specific to one specific moment during their visit and furthermore the medical decision making encompasses the entire chart and not only that represented in this note.     I personally spent 25 minutes today providing care for this patient, including preparation, face to face time, documentation and other services such as review of medical records, diagnostic result, patient education, counseling, coordination of care as specified in the encounter.      Rambo Montenegro, DO

## 2024-11-08 NOTE — PROGRESS NOTES
Pt is eating and sleeping well. Pt is compliant with medications and care. Pt ambulates independently on the unit. Pt is A&OX4 and able to make needs known. Pt is pleasant and cooperative. Pt presents with improved mood. Pt denies SI. Pt is appropriate to discharge. Pt is agreeable to outpatient services and recommendations. Pt has appointments scheduled with [facility]: Psychiatry 11/20/24 at 2pm and counseling 11/27/24 at 12pm. Pt will be picked up today at 3pm by pt's wife and return home.     Amarilis Jung, MSW LSW

## 2024-11-08 NOTE — NURSING NOTE
"Discharge Nursing Note    Discharge date: 11/08/24    Discharge time: 3:00 PM      Discharged to:  home    Transportation provided by:  car    Responsible person notified of discharge/transfer?  Include name of person if answering \"YES\"  Yes - self    Patient left with all belongings:  Yes    Patient left with discharge medication list:  Yes    Patient left with discharge instructions:  Yes    AVS/Continuing Care Plan discussed with patient and copy given to either patient or handed to medical transport for discharges to facilities:  Yes    Other concerns at discharge:  none    Presentation on discharge:  pleasant   "

## 2024-11-08 NOTE — PROGRESS NOTES
[facility]: [facility]  PROGRESS NOTE      Juwan Arana is a 59 y.o. male that is being seen  today for for follow-up of [facility].  Subjective   Patient is a 59-year-old male with history of hypertension, diabetes, depression and anxiety and hyperlipidemia who is being seen for follow-up at [facility].  Patient's blood pressure is fairly controlled.  Patient's blood sugars have been better controlled.      ROS  Negative for fever or chills  Negative for sore throat, ear pain, nasal discharge  Negative for cough, shortness of breath or wheezing  Negative for chest pain, palpitations, swelling of legs  Negative for abdominal pain, constipation, diarrhea, blood in the stools  Negative for urinary complaints  Negative for headache, dizziness, weakness or numbness  Negative for joint pain  Positive for depression and anxiety  All other systems reviewed and were negative   Vitals:    11/08/24 0617   BP: 128/77   Pulse: 82   Resp: 18   Temp: 37 °C (98.6 °F)   SpO2: 94%      Vitals:    11/06/24 0556   Weight: 85.7 kg (188 lb 15 oz)     Body mass index is 26.35 kg/m².  Physical Exam  Constitutional: Patient does not appear to be in any acute distress  Head and Face: NCAT  Eyes: Normal external exam, EOMI  ENT: Normal external inspection of ears and nose. Oropharynx normal.  Cardiovascular: RRR, S1/S2, no murmurs, rubs, or gallops, radial pulses +2, no edema of extremities  Pulmonary: CTAB, no respiratory distress.  Abdomen: +BS, soft, non-tender, nondistended, no guarding or rebound, no masses noted  MSK: No joint swelling, normal movements of all extremities. Range of motion- normal.  Skin- No lesions, contusions, or erythema.  Peripheral puslses palpable bilaterally 2+  Neuro: AAO X3, Cranial nerves 2-12 grossly intact,DTR 2+ in all 4 limbs   Psychiatric: Judgment intact. Appropriate mood and behavior    LABS   [unfilled]  Lab Results   Component Value Date    GLUCOSE 172 (H) 11/02/2024     CALCIUM 9.3 11/02/2024     11/02/2024    K 4.2 11/02/2024    CO2 18 (L) 11/02/2024     11/02/2024    BUN 15 11/02/2024    CREATININE 0.99 11/02/2024     Lab Results   Component Value Date    ALT 25 11/02/2024    AST 17 11/02/2024    ALKPHOS 31 (L) 11/02/2024    BILITOT 1.1 11/02/2024     Lab Results   Component Value Date    WBC 4.7 11/02/2024    HGB 16.5 11/02/2024    HCT 50.0 11/02/2024    MCV 83 11/02/2024     11/02/2024     Lab Results   Component Value Date    CHOL 217 (H) 11/02/2024     Lab Results   Component Value Date    HDL 49.6 11/02/2024     Lab Results   Component Value Date    LDLCALC 128 (H) 11/02/2024     Lab Results   Component Value Date    TRIG 197 (H) 11/02/2024     Lab Results   Component Value Date    HGBA1C 9.9 (H) 11/02/2024     Other labs not included in the list above were reviewed either before or during this encounter.    History    Past Medical History:   Diagnosis Date    Anxiety     Diabetes mellitus (Multi)      No past surgical history on file.  No family history on file.  No Known Allergies  No current facility-administered medications on file prior to encounter.     Current Outpatient Medications on File Prior to Encounter   Medication Sig Dispense Refill    dapagliflozin-saxagliptin (Qtern) 5-5 mg tablet Take 1 tablet by mouth once daily.      Farxiga 10 mg Take 1 tablet (10 mg) by mouth once daily with breakfast.      lisinopril 10 mg tablet Take 1 tablet (10 mg) by mouth once daily.      metFORMIN (Glucophage) 1,000 mg tablet Take 1 tablet (1,000 mg) by mouth 2 times daily (morning and late afternoon).      pravastatin (Pravachol) 10 mg tablet Take 0.5 tablets (5 mg) by mouth once daily.      [DISCONTINUED] PARoxetine (Paxil) 10 mg tablet Take 1 tablet (10 mg) by mouth once daily in the morning.         There is no immunization history on file for this patient.  Patient's medical history was reviewed and updated either before or during this  encounter.  ASSESSMENT / PLAN:  Active Hospital Problems    Type 2 diabetes mellitus without complication, without long-term current use of insulin (Multi)      Mixed hyperlipidemia      Primary hypertension      *Severe recurrent major depression without psychotic features (Multi)       Patient is being seen for follow-up at [facility].  Patient blood sugars are better controlled.     Mik Mascorro MD

## 2024-11-08 NOTE — NURSING NOTE
PHILIP NOTE     Problem:  SI     Behavior:  Pt is pleasant and cooperative, watching TV with peers, makes good eye contact, positive mood is noted, reported being discharged tomorrow. Pt denied SI    Group Participation: n/a  Appetite/Meals: hs snack provided       Interventions:  No intervention needed    Response:  N/a     Plan:  Continue monitoring until d/c

## 2024-11-08 NOTE — GROUP NOTE
Group Topic: Self-Care/Wellness   Group Date: 11/8/2024  Start Time: 1000  End Time: 1115  Facilitators: QUINTON Zee   Department: [facility]    Number of Participants: 6   Group Focus: other [facility]  Treatment Modality: Other: Recreation Therapy  Interventions utilized were exploration, group exercise, patient education, problem solving, and support  Purpose: coping skills, maladaptive thinking, feelings, irrational fears, communication skills, insight or knowledge, self-worth, self-care, relapse prevention strategies, and trigger / craving management    Name: Juwan Arana YOB: 1965   MR: [id number]      Facilitator: Recreational Therapist  Level of Participation: active  Quality of Participation: appropriate/pleasant, attentive, and cooperative  Interactions with others: appropriate and gave feedback  Mood/Affect: appropriate and blunted  Triggers (if applicable): n/a  Cognition: coherent/clear  Progress: Significant  Comments: pt problem is depressed mood.  Pt joins group with little encouragement.  Engages easily and appropriately.    Plan: continue with services

## 2024-11-08 NOTE — GROUP NOTE
Group Topic: Other   Group Date: 11/8/2024  Start Time: 1330  End Time: 1430  Facilitators: QUINTON Zee   Department: [facility]    Number of Participants: 6   Group Focus: other Trivia group  Treatment Modality: Other: Recreation Therapy  Interventions utilized were mental fitness  Purpose: other: fun, elevate mood, increase socialization, enhance self esteem, stimulate memory    Name: Juwan Arana YOB: 1965   MR: [id number]      Facilitator: Recreational Therapist  Level of Participation: active  Quality of Participation: appropriate/pleasant, attentive, and cooperative  Interactions with others: appropriate and gave feedback  Mood/Affect: appropriate and brightens with interaction  Triggers (if applicable): n/a  Cognition: coherent/clear  Progress: Significant  Comments: pt problem is depressed mood.  Pt remains active and appropriate with participation during group trivia game.   Plan: continue with services

## 2024-11-08 NOTE — CARE PLAN
The patient's goals for the shift include go home    The clinical goals for the shift include promote rest    Over the shift, the patient did make progress toward the following goals. Barriers to progression include awaiting discharge plan. Recommendations to address these barriers include positive encouragement.      Problem: Risk for Suicide  Goal: Accepts medications as prescribed/needed this shift  Outcome: Progressing

## 2024-11-14 LAB
ATRIAL RATE: 88 BPM
P AXIS: 2 DEGREES
PR INTERVAL: 170 MS
Q ONSET: 252 MS
QRS COUNT: 14 BEATS
QRS DURATION: 97 MS
QT INTERVAL: 361 MS
QTC CALCULATION(BAZETT): 437 MS
QTC FREDERICIA: 410 MS
R AXIS: -29 DEGREES
T AXIS: -4 DEGREES
T OFFSET: 433 MS
VENTRICULAR RATE: 88 BPM

## 2025-05-07 ENCOUNTER — OFFICE VISIT (OUTPATIENT)
Dept: URGENT CARE | Age: 60
End: 2025-05-07
Payer: OTHER MISCELLANEOUS

## 2025-05-07 VITALS
TEMPERATURE: 97.9 F | OXYGEN SATURATION: 94 % | DIASTOLIC BLOOD PRESSURE: 82 MMHG | WEIGHT: 200 LBS | SYSTOLIC BLOOD PRESSURE: 124 MMHG | BODY MASS INDEX: 28 KG/M2 | HEIGHT: 71 IN | RESPIRATION RATE: 20 BRPM | HEART RATE: 89 BPM
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
## 2025-05-07 DIAGNOSIS — S61.213A LACERATION OF LEFT MIDDLE FINGER WITHOUT FOREIGN BODY WITHOUT DAMAGE TO NAIL, INITIAL ENCOUNTER: Primary | ICD-10-CM

## 2025-05-07 PROBLEM — I15.2 HYPERTENSION ASSOCIATED WITH DIABETES: Status: ACTIVE | Noted: 2018-08-20

## 2025-05-07 PROBLEM — M54.9 BACKACHE: Status: ACTIVE | Noted: 2025-05-07
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
## 2025-05-07 PROBLEM — R94.31 ABNORMAL EKG: Status: ACTIVE | Noted: 2023-05-01
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
## 2025-05-07 PROBLEM — S20.211A CONTUSION OF RIGHT FRONT WALL OF THORAX: Status: ACTIVE | Noted: 2018-05-14

## 2025-05-07 PROBLEM — E11.59 HYPERTENSION ASSOCIATED WITH DIABETES: Status: ACTIVE | Noted: 2018-08-20

## 2025-05-07 PROBLEM — S61.009A: Status: ACTIVE | Noted: 2025-05-07

## 2025-05-07 PROBLEM — T14.90XD: Status: ACTIVE | Noted: 2018-05-29

## 2025-05-07 PROBLEM — S61.215A: Status: ACTIVE | Noted: 2018-05-12

## 2025-05-07 PROBLEM — M54.2 CERVICALGIA: Status: ACTIVE | Noted: 2018-05-12

## 2025-05-07 RX ORDER — ASPIRIN 81 MG/1
81 TABLET ORAL
COMMUNITY
Start: 2023-06-02

## 2025-05-07 RX ORDER — TRIAMCINOLONE ACETONIDE 1 MG/G
CREAM TOPICAL 2 TIMES DAILY
COMMUNITY
Start: 2025-02-05

## 2025-05-07 RX ORDER — PAROXETINE HYDROCHLORIDE 20 MG/1
1 TABLET, FILM COATED ORAL
COMMUNITY
Start: 2025-01-27

## 2025-05-07 ASSESSMENT — PATIENT HEALTH QUESTIONNAIRE - PHQ9
SUM OF ALL RESPONSES TO PHQ9 QUESTIONS 1 AND 2: 0
1. LITTLE INTEREST OR PLEASURE IN DOING THINGS: NOT AT ALL
2. FEELING DOWN, DEPRESSED OR HOPELESS: NOT AT ALL

## 2025-05-07 ASSESSMENT — ENCOUNTER SYMPTOMS
OCCASIONAL FEELINGS OF UNSTEADINESS: 0
CONSTITUTIONAL NEGATIVE: 1
WOUND: 1
DEPRESSION: 0
RESPIRATORY NEGATIVE: 1
NEUROLOGICAL NEGATIVE: 1
PSYCHIATRIC NEGATIVE: 1
LOSS OF SENSATION IN FEET: 0
MUSCULOSKELETAL NEGATIVE: 1

## 2025-05-07 NOTE — PROGRESS NOTES
"Subjective   Patient ID: Juwan Arana is a 59 y.o. male. They present today with a chief complaint of Laceration (Was cutting meat at work when knife slipped, lacerating 3rd digit on left hand.).  Washed with water, wrapped with gauze and tape.  No difficulty moving left 3rd finger.  Up to date on tetanus, had in 2017 and 2018.      Laceration      Past Medical History  Allergies as of 05/07/2025 - Reviewed 05/07/2025   Allergen Reaction Noted    Citalopram hydrobromide Hallucinations 09/15/2014    Sertraline Hallucinations 09/15/2014    Statins-hmg-coa reductase inhibitors Syncope 01/11/2016       Prescriptions Prior to Admission[1]     Medical History[2]    Surgical History[3]     reports that he has never smoked. He has never used smokeless tobacco. He reports that he does not drink alcohol and does not use drugs.    Review of Systems  Review of Systems   Constitutional: Negative.    Respiratory: Negative.     Musculoskeletal: Negative.    Skin:  Positive for wound.   Neurological: Negative.    Psychiatric/Behavioral: Negative.                                    Objective    Vitals:    05/07/25 1607   BP: 124/82   BP Location: Right arm   Patient Position: Sitting   BP Cuff Size: Adult   Pulse: 89   Resp: 20   Temp: 36.6 °C (97.9 °F)   TempSrc: Oral   SpO2: 94%   Weight: 90.7 kg (200 lb)   Height: 1.803 m (5' 11\")     No LMP for male patient.    Physical Exam  Constitutional:       Appearance: Normal appearance.   Cardiovascular:      Rate and Rhythm: Normal rate and regular rhythm.      Pulses: Normal pulses.      Heart sounds: Normal heart sounds.   Pulmonary:      Effort: Pulmonary effort is normal.      Breath sounds: Normal breath sounds.   Musculoskeletal:         General: Normal range of motion.   Skin:     Capillary Refill: Capillary refill takes less than 2 seconds.   Neurological:      General: No focal deficit present.      Mental Status: He is alert and oriented to person, place, and time. "   Psychiatric:         Mood and Affect: Mood normal.         Behavior: Behavior normal.         Thought Content: Thought content normal.         Judgment: Judgment normal.         Procedures    Point of Care Test & Imaging Results from this visit  No results found for this visit on 05/07/25.   Imaging  No results found.    Cardiology, Vascular, and Other Imaging  No other imaging results found for the past 2 days      Diagnostic study results (if any) were reviewed by NATALIIA Epps.    Assessment/Plan   Allergies, medications, history, and pertinent labs/EKGs/Imaging reviewed by NATALIIA Epps.     Medical Decision Making  ***    Orders and Diagnoses  Diagnoses and all orders for this visit:  Laceration of left middle finger without foreign body without damage to nail, initial encounter  Work related injury      Medical Admin Record      Patient disposition: { Disposition:82456}    Electronically signed by NATALIIA Epps  6:53 PM           [1] (Not in a hospital admission)  [2]   Past Medical History:  Diagnosis Date    Anxiety     Diabetes mellitus (Multi)    [3] No past surgical history on file.     5-0    Suture material:  Nylon    Suture technique:  Simple interrupted    Number of sutures:  3  Approximation:     Approximation:  Close  Repair type:     Repair type:  Simple  Post-procedure details:     Dressing:  Adhesive bandage    Procedure completion:  Tolerated well, no immediate complications      Point of Care Test & Imaging Results from this visit  No results found for this visit on 05/07/25.   Imaging  No results found.    Cardiology, Vascular, and Other Imaging  No other imaging results found for the past 2 days      Diagnostic study results (if any) were reviewed by NATALIIA Epps.    Assessment/Plan   Allergies, medications, history, and pertinent labs/EKGs/Imaging reviewed by NATALIIA Epps.     Medical Decision Making  History and exam consistent with simple laceration.  Repaired with 3 sutures, no indication for xray.  Neurovascularly intact.  Sutures can be removed in 7-10 days. Keep wound clean with soap and water and apply antibiotic ointment and bandage daily.   Avoid soaking in tub bath, swimming pool, jacuzzi  Follow up with any signs of infection: Redness, drainage, swelling, pain, fever, chills  [employer]/Froi completed, given to staff to scan into chart    Orders and Diagnoses  Diagnoses and all orders for this visit:  Laceration of left middle finger without foreign body without damage to nail, initial encounter  Work related injury  Other orders  -     Laceration Repair      Medical Admin Record      Patient disposition: Home    Electronically signed by NATALIIA Epps  6:46 PM           [1] (Not in a hospital admission)   [2]   Past Medical History:  Diagnosis Date    Anxiety     Diabetes mellitus (Multi)    [3] No past surgical history on file.

## 2025-05-17 ENCOUNTER — OFFICE VISIT (OUTPATIENT)
Dept: URGENT CARE | Age: 60
End: 2025-05-17
Payer: OTHER MISCELLANEOUS

## 2025-05-17 VITALS — TEMPERATURE: 98.2 F

## 2025-05-17 DIAGNOSIS — S61.313D LACERATION OF LEFT MIDDLE FINGER WITHOUT FOREIGN BODY WITH DAMAGE TO NAIL, SUBSEQUENT ENCOUNTER: Primary | ICD-10-CM

## 2025-05-17 ASSESSMENT — ENCOUNTER SYMPTOMS
WOUND: 1
RESPIRATORY NEGATIVE: 1
CARDIOVASCULAR NEGATIVE: 1
CONSTITUTIONAL NEGATIVE: 1
NEUROLOGICAL NEGATIVE: 1
MUSCULOSKELETAL NEGATIVE: 1
PSYCHIATRIC NEGATIVE: 1

## 2025-05-17 NOTE — PATIENT INSTRUCTIONS
History and exam consistent with simple laceration.  Repaired with 3 sutures, no indication for xray.  Neurovascularly intact.  Sutures can be removed in 7-10 days. Keep wound clean with soap and water and apply antibiotic ointment and bandage daily.   Avoid soaking in tub bath, swimming pool, jacuzzi  Follow up with any signs of infection: Redness, drainage, swelling, pain, fever, chills  [employer]/Froi completed, given to staff to scan into chart

## 2025-05-17 NOTE — PROGRESS NOTES
Subjective   Patient ID: Juwan Arana is a 59 y.o. male. They present today with a chief complaint of Suture / Staple Removal.    History of Present Illness  5/7/25 had 3 sutures placed 3rd finger left hand, [facility], here for removal.  No signs of infection.        Suture / Staple Removal         Past Medical History  Allergies as of 05/17/2025 - Reviewed 05/17/2025   Allergen Reaction Noted    Citalopram hydrobromide Hallucinations 09/15/2014    Sertraline Hallucinations 09/15/2014    Statins-hmg-coa reductase inhibitors Syncope 01/11/2016       Prescriptions Prior to Admission[1]     Medical History[2]    Surgical History[3]     reports that he has never smoked. He has never used smokeless tobacco. He reports that he does not drink alcohol and does not use drugs.    Review of Systems  Review of Systems   Constitutional: Negative.    Respiratory: Negative.     Cardiovascular: Negative.    Musculoskeletal: Negative.    Skin:  Positive for wound.   Neurological: Negative.    Psychiatric/Behavioral: Negative.                                    Objective    Vitals:    05/17/25 1625   Temp: 36.8 °C (98.2 °F)   TempSrc: Oral     No LMP for male patient.    Physical Exam  Constitutional:       Appearance: Normal appearance.   Cardiovascular:      Rate and Rhythm: Normal rate.   Pulmonary:      Effort: Pulmonary effort is normal.   Musculoskeletal:         General: Normal range of motion.   Skin:     General: Skin is warm and dry.      Capillary Refill: Capillary refill takes less than 2 seconds.      Comments: Healing laceration left 3rd finger-skin edges well approximated, no s/ of infection.  3 sutures removed   Neurological:      General: No focal deficit present.      Mental Status: He is alert and oriented to person, place, and time.   Psychiatric:         Mood and Affect: Mood normal.         Procedures    Point of Care Test & Imaging Results from this visit  No results found for this visit on 05/17/25.    Imaging  No results found.    Cardiology, Vascular, and Other Imaging  No other imaging results found for the past 2 days      Diagnostic study results (if any) were reviewed by NATALIIA Epps.    Assessment/Plan   Allergies, medications, history, and pertinent labs/EKGs/Imaging reviewed by NATALIIA Epps.     Medical Decision Making  Suture Removal - MDM- Suture removal able to be completed without evidence of infection or  wound dehiscence. Wound remains closed and well approximated following removal of sutures.  See procedure note. Patient counseled on wound care at home and advised to return with any  further concerns     Orders and Diagnoses  Diagnoses and all orders for this visit:  Laceration of left middle finger without foreign body with damage to nail, subsequent encounter      Medical Admin Record      Patient disposition: Home    Electronically signed by NATALIIA Epps  6:41 PM           [1] (Not in a hospital admission)   [2]   Past Medical History:  Diagnosis Date    Anxiety     Diabetes mellitus (Multi)    [3] No past surgical history on file.

## 2025-05-17 NOTE — PATIENT INSTRUCTIONS
Suture Removal - MDM- Suture removal able to be completed without evidence of infection or  wound dehiscence. Wound remains closed and well approximated following removal of sutures.  See procedure note. Patient counseled on wound care at home and advised to return with any  further concerns